# Patient Record
Sex: MALE | Race: WHITE | NOT HISPANIC OR LATINO | Employment: OTHER | URBAN - METROPOLITAN AREA
[De-identification: names, ages, dates, MRNs, and addresses within clinical notes are randomized per-mention and may not be internally consistent; named-entity substitution may affect disease eponyms.]

---

## 2023-12-18 ENCOUNTER — TRANSFERRED RECORDS (OUTPATIENT)
Dept: HEALTH INFORMATION MANAGEMENT | Facility: CLINIC | Age: 73
End: 2023-12-18

## 2024-01-04 ENCOUNTER — PRE VISIT (OUTPATIENT)
Dept: ONCOLOGY | Facility: CLINIC | Age: 74
End: 2024-01-04
Payer: MEDICARE

## 2024-01-04 ENCOUNTER — PATIENT OUTREACH (OUTPATIENT)
Dept: ONCOLOGY | Facility: CLINIC | Age: 74
End: 2024-01-04
Payer: MEDICARE

## 2024-01-04 ENCOUNTER — TRANSCRIBE ORDERS (OUTPATIENT)
Dept: OTHER | Age: 74
End: 2024-01-04

## 2024-01-04 DIAGNOSIS — C95.90 LEUKEMIA (H): Primary | ICD-10-CM

## 2024-01-04 NOTE — TELEPHONE ENCOUNTER
Action Taken  Date/Description  TJ     WT from NPS 2024  3:13 PM   Call from PT's wife Elisa to [facility] care and schedule for a Dx of Leukemia (H) [C95.90]    All records are at [facility]  Pt was Diagnosed just before [place] via labs  No bx or imaging has been done     Action 2024 1:38 PM ABT   Action Taken Called [facility] [phone] spoke to Salima, she states that they do not have the [facility] peripheral smear case and would have to call [facility] to get case/slides.    Called [facility] [phone] and spoke to Blayne in Histopath, unsure if they can send our peripheral smear slides. Blayne will call back after speaking to his manager Sai.    3:52 PM  Called back from [facility], states they can send out brady smear slides and to send request to [phone] with slide custody agreement form.     RECORDS STATUS - ALL OTHER DIAGNOSIS      RECORDS RECEIVED FROM: [facility]   DATE RECEIVED:    NOTES STATUS DETAILS   OFFICE NOTE from referring provider Self    OFFICE NOTE from other specialist [facility] 23: Pinky Duncan PA-C   MEDICATION LIST [facility]    LABS     PATHOLOGY REPORTS Req -[facility]  [employer]   ANYTHING RELATED TO DIAGNOSIS CE-[facility] Most recent 23

## 2024-01-04 NOTE — PROGRESS NOTES
[facility]: Hematology                                                                Hem/Onc  Referral reviewed  Referral Details  Referred By  Referred To   Referral from telephone call with the patient wife Elisa.    Diagnoses: Leukemia (H)   Order: Adult Oncology/Hematology  Referral    [facility]      Comment: Self referral for consult for leukemia. Patient has been seen by Pinky MURPHY.  Medical records are at [facility]  [address] [facility] [address]. Ph. [phone]      Order Questions  Question Answer   My Clinical Question Is: consult for leukemia      ASSESSMENT      Clinical History (per Nurse review of records provided):    73 year old male patient with leukocytosis and peripheral smear indicating possible new CLL. 12/12 office visit: c/o fatigue    [facility]   Pertinent labs -- BOOKMARKED  PCP provider note(s)-- BOOKMARKED    INTERVENTION(S)                                                      - OUTGOING CALL to pt:  Introduced my role as nurse navigator with [facility] Hematology/Oncology dept and that we have recd the referral for dx of possible CLL.  Elisa states that she is a patient of Dr Keys's for multiple myeloma and when PCP referred to [facility], pt's wife asked for referral to [facility] instead. They had not heard from us so she called in today. Explained to Elisa that I'm glad she called and we will coordinate consult asap.  Attempted to warm-transfer pt / wife to NPS to schedule consult next week, transfer unsuccessful.  Explained to pt that they will receive a call from our scheduling intake team and they denied any questions.    -Referral Triage Scheduling Instructions added to Hem/Onc  referral for Patient Access rep     PLAN                                                       Hematology / Oncology consult next week    See records team's Pre-Visit encounter documentation for additional records retrieval information.    Verónica Ramirez, RN, BSN, OCN  Hematology/Oncology New Patient Nurse Navigator   [facility]  [phone]

## 2024-01-11 ENCOUNTER — LAB (OUTPATIENT)
Dept: LAB | Facility: CLINIC | Age: 74
End: 2024-01-11
Payer: MEDICARE

## 2024-01-11 DIAGNOSIS — C95.90 LEUKEMIA (H): Primary | ICD-10-CM

## 2024-01-11 LAB
PATH REPORT.COMMENTS IMP SPEC: ABNORMAL
PATH REPORT.COMMENTS IMP SPEC: YES
PATH REPORT.FINAL DX SPEC: ABNORMAL
PATH REPORT.GROSS SPEC: ABNORMAL
PATH REPORT.RELEVANT HX SPEC: ABNORMAL
PATH REPORT.RELEVANT HX SPEC: ABNORMAL
PATH REPORT.SITE OF ORIGIN SPEC: ABNORMAL

## 2024-01-11 PROCEDURE — 88321 CONSLTJ&REPRT SLD PREP ELSWR: CPT | Performed by: PATHOLOGY

## 2024-01-12 ENCOUNTER — PATIENT OUTREACH (OUTPATIENT)
Dept: ONCOLOGY | Facility: CLINIC | Age: 74
End: 2024-01-12

## 2024-01-12 ENCOUNTER — ONCOLOGY VISIT (OUTPATIENT)
Dept: ONCOLOGY | Facility: CLINIC | Age: 74
End: 2024-01-12
Attending: INTERNAL MEDICINE
Payer: MEDICARE

## 2024-01-12 ENCOUNTER — APPOINTMENT (OUTPATIENT)
Dept: LAB | Facility: CLINIC | Age: 74
End: 2024-01-12
Attending: INTERNAL MEDICINE
Payer: MEDICARE

## 2024-01-12 VITALS
SYSTOLIC BLOOD PRESSURE: 134 MMHG | DIASTOLIC BLOOD PRESSURE: 77 MMHG | RESPIRATION RATE: 16 BRPM | TEMPERATURE: 97.8 F | HEART RATE: 70 BPM | OXYGEN SATURATION: 97 % | WEIGHT: 220.7 LBS
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
## 2024-01-12 DIAGNOSIS — Z11.59 ENCOUNTER FOR SCREENING FOR VIRAL DISEASE: ICD-10-CM

## 2024-01-12 DIAGNOSIS — D47.9 LYMPHOPROLIFERATIVE DISORDER (H): Primary | ICD-10-CM

## 2024-01-12 DIAGNOSIS — C85.99 NON-HODGKIN LYMPHOMA, UNSPECIFIED, EXTRANODAL AND SOLID ORGAN SITES (H): ICD-10-CM

## 2024-01-12 DIAGNOSIS — Z11.4 ENCOUNTER FOR SCREENING FOR HUMAN IMMUNODEFICIENCY VIRUS (HIV): ICD-10-CM

## 2024-01-12 LAB
ALBUMIN SERPL BCG-MCNC: 4.7 G/DL (ref 3.5–5.2)
ALP SERPL-CCNC: 74 U/L (ref 40–150)
ALT SERPL W P-5'-P-CCNC: 23 U/L (ref 0–70)
ANION GAP SERPL CALCULATED.3IONS-SCNC: 10 MMOL/L (ref 7–15)
AST SERPL W P-5'-P-CCNC: 23 U/L (ref 0–45)
BASOPHILS # BLD AUTO: ABNORMAL 10*3/UL
BASOPHILS # BLD MANUAL: 0 10E3/UL (ref 0–0.2)
BASOPHILS NFR BLD AUTO: ABNORMAL %
BASOPHILS NFR BLD MANUAL: 0 %
BILIRUB SERPL-MCNC: 0.7 MG/DL
BUN SERPL-MCNC: 23.4 MG/DL (ref 8–23)
CALCIUM SERPL-MCNC: 9.8 MG/DL (ref 8.8–10.2)
CHLORIDE SERPL-SCNC: 105 MMOL/L (ref 98–107)
CREAT SERPL-MCNC: 1.23 MG/DL (ref 0.67–1.17)
DEPRECATED HCO3 PLAS-SCNC: 25 MMOL/L (ref 22–29)
EGFRCR SERPLBLD CKD-EPI 2021: 62 ML/MIN/1.73M2
EOSINOPHIL # BLD AUTO: ABNORMAL 10*3/UL
EOSINOPHIL # BLD MANUAL: 0 10E3/UL (ref 0–0.7)
EOSINOPHIL NFR BLD AUTO: ABNORMAL %
EOSINOPHIL NFR BLD MANUAL: 0 %
ERYTHROCYTE [DISTWIDTH] IN BLOOD BY AUTOMATED COUNT: 12.9 % (ref 10–15)
GLUCOSE SERPL-MCNC: 120 MG/DL (ref 70–99)
HBV CORE AB SERPL QL IA: NONREACTIVE
HBV SURFACE AB SERPL IA-ACNC: <3.5 M[IU]/ML
HBV SURFACE AB SERPL IA-ACNC: NONREACTIVE M[IU]/ML
HBV SURFACE AG SERPL QL IA: NONREACTIVE
HCT VFR BLD AUTO: 48.9 % (ref 40–53)
HCV AB SERPL QL IA: NONREACTIVE
HGB BLD-MCNC: 16.7 G/DL (ref 13.3–17.7)
HIV 1+2 AB+HIV1 P24 AG SERPL QL IA: NONREACTIVE
IGA SERPL-MCNC: 120 MG/DL (ref 84–499)
IGG SERPL-MCNC: 996 MG/DL (ref 610–1616)
IGM SERPL-MCNC: 62 MG/DL (ref 35–242)
IMM GRANULOCYTES # BLD: ABNORMAL 10*3/UL
IMM GRANULOCYTES NFR BLD: ABNORMAL %
INTERPRETATION: NORMAL
KAPPA LC FREE SER-MCNC: 1.6 MG/DL (ref 0.33–1.94)
KAPPA LC FREE/LAMBDA FREE SER NEPH: 0.44 {RATIO} (ref 0.26–1.65)
LAMBDA LC FREE SERPL-MCNC: 3.67 MG/DL (ref 0.57–2.63)
LDH SERPL L TO P-CCNC: 171 U/L (ref 0–250)
LYMPHOCYTES # BLD AUTO: ABNORMAL 10*3/UL
LYMPHOCYTES # BLD MANUAL: 18.2 10E3/UL (ref 0.8–5.3)
LYMPHOCYTES NFR BLD AUTO: ABNORMAL %
LYMPHOCYTES NFR BLD MANUAL: 77 %
MCH RBC QN AUTO: 30.3 PG (ref 26.5–33)
MCHC RBC AUTO-ENTMCNC: 34.2 G/DL (ref 31.5–36.5)
MCV RBC AUTO: 89 FL (ref 78–100)
MONOCYTES # BLD AUTO: ABNORMAL 10*3/UL
MONOCYTES # BLD MANUAL: 1.2 10E3/UL (ref 0–1.3)
MONOCYTES NFR BLD AUTO: ABNORMAL %
MONOCYTES NFR BLD MANUAL: 5 %
NEUTROPHILS # BLD AUTO: ABNORMAL 10*3/UL
NEUTROPHILS # BLD MANUAL: 4.2 10E3/UL (ref 1.6–8.3)
NEUTROPHILS NFR BLD AUTO: ABNORMAL %
NEUTROPHILS NFR BLD MANUAL: 18 %
NRBC # BLD AUTO: 0 10E3/UL
NRBC BLD AUTO-RTO: 0 /100
PHOSPHATE SERPL-MCNC: 3.4 MG/DL (ref 2.5–4.5)
PLAT MORPH BLD: ABNORMAL
PLATELET # BLD AUTO: 212 10E3/UL (ref 150–450)
POTASSIUM SERPL-SCNC: 4.6 MMOL/L (ref 3.4–5.3)
PROT SERPL-MCNC: 7.2 G/DL (ref 6.4–8.3)
RBC # BLD AUTO: 5.52 10E6/UL (ref 4.4–5.9)
RBC MORPH BLD: ABNORMAL
SIGNIFICANT RESULTS: NORMAL
SODIUM SERPL-SCNC: 140 MMOL/L (ref 135–145)
SPECIMEN DESCRIPTION: NORMAL
TEST DETAILS, MDL: NORMAL
URATE SERPL-MCNC: 8.2 MG/DL (ref 3.4–7)
WBC # BLD AUTO: 23.6 10E3/UL (ref 4–11)

## 2024-01-12 PROCEDURE — 88271 CYTOGENETICS DNA PROBE: CPT | Performed by: INTERNAL MEDICINE

## 2024-01-12 PROCEDURE — 85007 BL SMEAR W/DIFF WBC COUNT: CPT | Performed by: INTERNAL MEDICINE

## 2024-01-12 PROCEDURE — 88368 INSITU HYBRIDIZATION MANUAL: CPT | Mod: 26 | Performed by: MEDICAL GENETICS

## 2024-01-12 PROCEDURE — 87340 HEPATITIS B SURFACE AG IA: CPT | Performed by: INTERNAL MEDICINE

## 2024-01-12 PROCEDURE — 84100 ASSAY OF PHOSPHORUS: CPT | Performed by: INTERNAL MEDICINE

## 2024-01-12 PROCEDURE — 81351 TP53 GENE FULL GENE SEQUENCE: CPT | Performed by: INTERNAL MEDICINE

## 2024-01-12 PROCEDURE — G0463 HOSPITAL OUTPT CLINIC VISIT: HCPCS | Performed by: INTERNAL MEDICINE

## 2024-01-12 PROCEDURE — 36415 COLL VENOUS BLD VENIPUNCTURE: CPT | Performed by: INTERNAL MEDICINE

## 2024-01-12 PROCEDURE — 86803 HEPATITIS C AB TEST: CPT | Performed by: INTERNAL MEDICINE

## 2024-01-12 PROCEDURE — 88369 M/PHMTRC ALYSISHQUANT/SEMIQ: CPT | Mod: 26 | Performed by: MEDICAL GENETICS

## 2024-01-12 PROCEDURE — 83521 IG LIGHT CHAINS FREE EACH: CPT | Performed by: INTERNAL MEDICINE

## 2024-01-12 PROCEDURE — 99204 OFFICE O/P NEW MOD 45 MIN: CPT | Performed by: INTERNAL MEDICINE

## 2024-01-12 PROCEDURE — 88189 FLOWCYTOMETRY/READ 16 & >: CPT | Mod: GC | Performed by: PATHOLOGY

## 2024-01-12 PROCEDURE — 83615 LACTATE (LD) (LDH) ENZYME: CPT | Performed by: INTERNAL MEDICINE

## 2024-01-12 PROCEDURE — 88185 FLOWCYTOMETRY/TC ADD-ON: CPT | Performed by: INTERNAL MEDICINE

## 2024-01-12 PROCEDURE — 84550 ASSAY OF BLOOD/URIC ACID: CPT | Performed by: INTERNAL MEDICINE

## 2024-01-12 PROCEDURE — 85027 COMPLETE CBC AUTOMATED: CPT | Performed by: INTERNAL MEDICINE

## 2024-01-12 PROCEDURE — 87389 HIV-1 AG W/HIV-1&-2 AB AG IA: CPT | Performed by: INTERNAL MEDICINE

## 2024-01-12 PROCEDURE — 86704 HEP B CORE ANTIBODY TOTAL: CPT | Performed by: INTERNAL MEDICINE

## 2024-01-12 PROCEDURE — G0452 MOLECULAR PATHOLOGY INTERPR: HCPCS | Mod: 26 | Performed by: STUDENT IN AN ORGANIZED HEALTH CARE EDUCATION/TRAINING PROGRAM

## 2024-01-12 PROCEDURE — 80053 COMPREHEN METABOLIC PANEL: CPT | Performed by: INTERNAL MEDICINE

## 2024-01-12 PROCEDURE — 86706 HEP B SURFACE ANTIBODY: CPT | Performed by: INTERNAL MEDICINE

## 2024-01-12 PROCEDURE — 82784 ASSAY IGA/IGD/IGG/IGM EACH: CPT | Performed by: INTERNAL MEDICINE

## 2024-01-12 PROCEDURE — 82232 ASSAY OF BETA-2 PROTEIN: CPT | Performed by: INTERNAL MEDICINE

## 2024-01-12 PROCEDURE — 86618 LYME DISEASE ANTIBODY: CPT | Performed by: INTERNAL MEDICINE

## 2024-01-12 ASSESSMENT — PAIN SCALES - GENERAL: PAINLEVEL: NO PAIN (0)

## 2024-01-12 NOTE — NURSING NOTE
Chief Complaint   Patient presents with    Blood Draw     Labs drawn via      Labs collected from venipuncture by RN. Vitals taken. Checked in for appointment(s).     Shannan MARTINEZ RN PHN BSN  BMT/Oncology Lab

## 2024-01-12 NOTE — LETTER
1/12/2024         RE: Kike Kee  [address]  [facility] [address]        Dear Colleague,    Thank you for referring your patient, Kike Kee, to the [facility]. Please see a copy of my visit note below.                                     [facility]               NEW PATIENT REFERRAL        Jan 12, 2024   Subjective  REFERRAL SOURCE: Self    REASON FOR VISIT: Lymphoproliferative disorder    HISTORY OF PRESENT ILLNESS:  Mr. Kike Kee is a 73 year old male who presents for consultation regarding lymphocytosis and possible lymphoproliferative disorder.     He was seen for annual Medicare wellness visit on 12/12/23 and was noted to have WBC of 19.7 with ALC 15, Hgb 16.9, and plts 175. Peripheral smear showed numerous circulating lymphocytes with clumped chromatin concerning for a lymphoproliferative disorder. Most recent CBC prior to that was 2019 where WBC was 11.1 and ALC mildly elevated at 4.6.     He is here today with his wife Elisa. Doing fairly well overall and denies any fevers/chills, night sweats, lumps/bumps, unintentional weight loss, chest pain, SOB, N/V, abd pain, diarrhea, or bleeding. He is more fatigued and napping more over the last year but still doing all normal activities and playing golf. He notes that he had a target-like rash on his left arm last summer and is wondering if he may have Lyme's disease. Did not see the tick itself.     PAST MEDICAL HISTORY:  Hypertension  Hyperlipidemia  Kidney stones    FAMILY HISTORY:  No hematologic malignancies that he is aware of.     SOCIAL HISTORY:  Never smoker. Drinks 1 glass of wine daily with dinner.   Lives in [place], [place] with wife Elisa. She has myeloma and is also a patient here. No kids.   Retired, previously worked as a . Now has a hobby collecting and selling ancient coins.      No Known Allergies  No current outpatient medications on file.     REVIEW OF  SYSTEMS:  12-point ROS reviewed and negative other than that mentioned in HPI.     Objective  VITAL SIGNS:  /77   Pulse 70   Temp 97.8  F (36.6  C) (Oral)   Resp 16   Wt 100.1 kg (220 lb 11.2 oz)   SpO2 97%     ECOG PS: 0     PHYSICAL EXAM:  General: Awake, alert, in no acute distress. Oriented x 3.  HEENT: Normocephalic, atraumatic. No scleral icterus.   Lymph: No cervical, supraclavicular, or axillary lymphadenopathy appreciated.   CV: Regular rate and rhythm. No murmurs, rubs, or gallops appreciated.  Resp: Good inspiratory effort, lungs clear to auscultation bilaterally.  GI: Abdomen soft, nontender, nondistended. No masses or organomegaly appreciated.   Ext: No peripheral edema bilaterally.  Neuro: CN II-XII grossly intact. No focal deficits.   Skin: No rash, unusual bruising or prominent lesions.  Psych: Pleasant, normal affect.    LABS:  Lab Results   Component Value Date    WBC 23.6 (H) 01/12/2024    HGB 16.7 01/12/2024    HCT 48.9 01/12/2024    MCV 89 01/12/2024     01/12/2024     Lab Results   Component Value Date     01/12/2024    POTASSIUM 4.6 01/12/2024    CR 1.23 (H) 01/12/2024    BUN 23.4 (H) 01/12/2024    CHLORIDE 105 01/12/2024    JOSE 9.8 01/12/2024     (H) 01/12/2024      Lab Results   Component Value Date    ALT 23 01/12/2024    AST 23 01/12/2024    ALKPHOS 74 01/12/2024    BILITOTAL 0.7 01/12/2024      Lab Results   Component Value Date     01/12/2024    URIC 8.2 (H) 01/12/2024     PATHOLOGY:  12/17/23 OS peripheral smear:  Peripheral blood ([id number], obtained 12/17/2023):                -Involved by a lymphoproliferative disorder (see comment)    The patient has an absolute lymphocytosis - 19 x10E3/uL.   A recent SPEP was negative for an M-spike.     This patient has numerous circulating lymphocytes, with mature chromatin; I favor a mature non-Hodgkin leukemia/lymphoma.  Smudge cells are present, and the chromatin is somewhat clumped.   Though I do  agree that CLL/SLL is in the differential, the morphologic findings are not entirely definitive for that (the cells are somewhat irregular and have more cytoplasm than expected).  It is imperative that this patient have peripheral blood flow cytometry performed to classify this lymphoproliferative disorder.  In addition, submitting samples at the same time for cytogenetics/FISH and NGS will also be helpful.     We agree with the outside diagnosis and appreciate the opportunity to review the case.    Assessment & Plan  #Lymphoproliferative disorder  Pleasant 73 year old male found to have absolute lymphocytosis to 15 on routine CBC. Most recent ALC in 2019 was 4.6 so this evolved sometime in the last 4 years. Hemoglobin, platelets, and LDH are normal. He is largely asymptomatic with no B-symptoms.     We will obtain peripheral flow cytometry for definitive diagnosis and he will need a PET-CT for staging as well. Differential diagnosis includes CLL/SLL and other low-grade B-cell lymphomas. We discussed that often CLL and low-grade B-cell lymphomas can just be observed if they are not causing B symptoms/other symptoms related to bulky LAD/splenomegaly or anemia/thrombocytopenia, which appears to be the case here. However we will discuss in much more detail once we have the final diagnosis and stage.    #Elevated Cr  #Elevated uric acid  Cr 1.23 today, baseline appears to be ~1.1. Uric acid also mildly elevated at 8.2. Unlikely to be related to TLS given low peripheral disease burden but will follow up flow cytometry and PET.   - Recommend increased PO fluid intake.   - Recheck next visit.     #Hx of targetoid rash  Had target rash and possible tick bite in summer 2023.  - Will check Lyme serologies.     #Ppx  - Recommend flu and Shingrix vaccines (pt to get at local [facility] or [employer]). He declines COVID booster.       PLAN:  - Labs today including peripheral flow cytometry, FISH, NGS  - PET-CT in next 2-3 weeks  -  Follow up with me after PET    Total of 50 minutes on patient visit, reviewing records, interpreting test results, placing orders, and documentation on the day of service.    Nai Meyer MD  Attending Physician, [facility]

## 2024-01-12 NOTE — NURSING NOTE
Oncology Rooming Note    January 12, 2024 11:44 AM   Kike Kee is a 73 year old male who presents for:    Chief Complaint   Patient presents with    Blood Draw     Labs drawn via     Oncology Clinic Visit     Leukemia      Initial Vitals: /77   Pulse 70   Temp 97.8  F (36.6  C) (Oral)   Resp 16   Wt 100.1 kg (220 lb 11.2 oz)   SpO2 97%  There is no height or weight on file to calculate BMI. There is no height or weight on file to calculate BSA.  No Pain (0) Comment: Data Unavailable   No LMP for male patient.  Allergies reviewed: Yes  Medications reviewed: Yes    Medications: Medication refills not needed today.  Pharmacy name entered into [employer]: [id number] - [facility] [address] [place] [address]    Frailty Screening:   Is the patient here for a new oncology consult visit in cancer care? 1. Yes. Over the past month, have you experienced difficulty or required a caregiver to assist with:   1. Balance, walking or general mobility (including any falls)? NO  2. Completion of self-care tasks such as bathing, dressing, toileting, grooming/hygiene?  NO  3. Concentration or memory that affects your daily life?  NO       Clinical concerns: no other complaints      Jose De Leon

## 2024-01-12 NOTE — PROGRESS NOTES
[facility]               NEW PATIENT REFERRAL        Jan 12, 2024   Subjective   REFERRAL SOURCE: Self    REASON FOR VISIT: Lymphoproliferative disorder    HISTORY OF PRESENT ILLNESS:  Mr. Kike Kee is a 73 year old male who presents for consultation regarding lymphocytosis and possible lymphoproliferative disorder.     He was seen for annual Medicare wellness visit on 12/12/23 and was noted to have WBC of 19.7 with ALC 15, Hgb 16.9, and plts 175. Peripheral smear showed numerous circulating lymphocytes with clumped chromatin concerning for a lymphoproliferative disorder. Most recent CBC prior to that was 2019 where WBC was 11.1 and ALC mildly elevated at 4.6.     He is here today with his wife Elisa. Doing fairly well overall and denies any fevers/chills, night sweats, lumps/bumps, unintentional weight loss, chest pain, SOB, N/V, abd pain, diarrhea, or bleeding. He is more fatigued and napping more over the last year but still doing all normal activities and playing golf. He notes that he had a target-like rash on his left arm last summer and is wondering if he may have Lyme's disease. Did not see the tick itself.     PAST MEDICAL HISTORY:  Hypertension  Hyperlipidemia  Kidney stones    FAMILY HISTORY:  No hematologic malignancies that he is aware of.     SOCIAL HISTORY:  Never smoker. Drinks 1 glass of wine daily with dinner.   Lives in [place], [place] with wife Elisa. She has myeloma and is also a patient here. No kids.   Retired, previously worked as a . Now has a hobby collecting and selling ancient coins.      No Known Allergies  No current outpatient medications on file.     REVIEW OF SYSTEMS:  12-point ROS reviewed and negative other than that mentioned in HPI.     Objective   VITAL SIGNS:  /77   Pulse 70   Temp 97.8  F (36.6  C) (Oral)   Resp 16   Wt 100.1 kg (220 lb 11.2 oz)   SpO2 97%     ECOG PS: 0     PHYSICAL EXAM:  General:  Awake, alert, in no acute distress. Oriented x 3.  HEENT: Normocephalic, atraumatic. No scleral icterus.   Lymph: No cervical, supraclavicular, or axillary lymphadenopathy appreciated.   CV: Regular rate and rhythm. No murmurs, rubs, or gallops appreciated.  Resp: Good inspiratory effort, lungs clear to auscultation bilaterally.  GI: Abdomen soft, nontender, nondistended. No masses or organomegaly appreciated.   Ext: No peripheral edema bilaterally.  Neuro: CN II-XII grossly intact. No focal deficits.   Skin: No rash, unusual bruising or prominent lesions.  Psych: Pleasant, normal affect.    LABS:  Lab Results   Component Value Date    WBC 23.6 (H) 01/12/2024    HGB 16.7 01/12/2024    HCT 48.9 01/12/2024    MCV 89 01/12/2024     01/12/2024     Lab Results   Component Value Date     01/12/2024    POTASSIUM 4.6 01/12/2024    CR 1.23 (H) 01/12/2024    BUN 23.4 (H) 01/12/2024    CHLORIDE 105 01/12/2024    JOSE 9.8 01/12/2024     (H) 01/12/2024      Lab Results   Component Value Date    ALT 23 01/12/2024    AST 23 01/12/2024    ALKPHOS 74 01/12/2024    BILITOTAL 0.7 01/12/2024      Lab Results   Component Value Date     01/12/2024    URIC 8.2 (H) 01/12/2024     PATHOLOGY:  12/17/23 OS peripheral smear:  Peripheral blood ([id number], obtained 12/17/2023):                -Involved by a lymphoproliferative disorder (see comment)    The patient has an absolute lymphocytosis - 19 x10E3/uL.   A recent SPEP was negative for an M-spike.     This patient has numerous circulating lymphocytes, with mature chromatin; I favor a mature non-Hodgkin leukemia/lymphoma.  Smudge cells are present, and the chromatin is somewhat clumped.   Though I do agree that CLL/SLL is in the differential, the morphologic findings are not entirely definitive for that (the cells are somewhat irregular and have more cytoplasm than expected).  It is imperative that this patient have peripheral blood flow cytometry performed to  classify this lymphoproliferative disorder.  In addition, submitting samples at the same time for cytogenetics/FISH and NGS will also be helpful.     We agree with the outside diagnosis and appreciate the opportunity to review the case.    Assessment & Plan   #Lymphoproliferative disorder  Pleasant 73 year old male found to have absolute lymphocytosis to 15 on routine CBC. Most recent ALC in 2019 was 4.6 so this evolved sometime in the last 4 years. Hemoglobin, platelets, and LDH are normal. He is largely asymptomatic with no B-symptoms.     We will obtain peripheral flow cytometry for definitive diagnosis and he will need a PET-CT for staging as well. Differential diagnosis includes CLL/SLL and other low-grade B-cell lymphomas. We discussed that often CLL and low-grade B-cell lymphomas can just be observed if they are not causing B symptoms/other symptoms related to bulky LAD/splenomegaly or anemia/thrombocytopenia, which appears to be the case here. However we will discuss in much more detail once we have the final diagnosis and stage.    #Elevated Cr  #Elevated uric acid  Cr 1.23 today, baseline appears to be ~1.1. Uric acid also mildly elevated at 8.2. Unlikely to be related to TLS given low peripheral disease burden but will follow up flow cytometry and PET.   - Recommend increased PO fluid intake.   - Recheck next visit.     #Hx of targetoid rash  Had target rash and possible tick bite in summer 2023.  - Will check Lyme serologies.     #Ppx  - Recommend flu and Shingrix vaccines (pt to get at local [facility] or [facility]). He declines COVID booster.       PLAN:  - Labs today including peripheral flow cytometry, FISH, NGS  - PET-CT in next 2-3 weeks  - Follow up with me after PET    Total of 50 minutes on patient visit, reviewing records, interpreting test results, placing orders, and documentation on the day of service.    Nai Meyer MD  Attending Physician, [facility]

## 2024-01-12 NOTE — PROGRESS NOTES
[facility]: Cancer Care Initial Note                                    Discussion with Patient:                                                      Met with Christiano after office visit/consult with Dr. Meyer. Introduced self as RN Care Coordinator. Went over contact information for [facility]. Discussed roles of RNCC, MD, BEHZAD, nurse triage line, and clinic coordinators. Discussed how to get a hold of different team members via [employer] and at [phone]. Authorization to discuss information form signed, gave permission to discuss medical information with spouse Elisa.       Assessment:                                                      Initial  Current living arrangement:: I live in a private home with spouse  Type of residence:: Private home - stairs  Informal Support system:: Spouse;Friends  Equipment Currently Used at Home: none  Bed or wheelchair confined:: No  Mobility Status: Independent  Transportation means:: Regular car  Medication adherence problem (GOAL):: No  Knowledgeable about how to use meds:: Yes  Medication side effects suspected:: No    Intervention/Education provided during outreach:                                                       Plan for PET scan and follow up visit with Dr. Meyer    Patient to follow up as scheduled at next appt  Patient to call/[employer] message with updates  Confirmed patient has clinic and triage numbers    Signature:  Verónica Oliver, RN

## 2024-01-15 LAB
B BURGDOR IGG+IGM SER QL: 0.06
B2 MICROGLOB TUMOR MARKER SER-MCNC: 2.4 MG/L
PATH REPORT.COMMENTS IMP SPEC: ABNORMAL
PATH REPORT.COMMENTS IMP SPEC: YES
PATH REPORT.FINAL DX SPEC: ABNORMAL
PATH REPORT.MICROSCOPIC SPEC OTHER STN: ABNORMAL
PATH REPORT.RELEVANT HX SPEC: ABNORMAL

## 2024-01-17 PROBLEM — I10 ESSENTIAL HYPERTENSION: Status: ACTIVE | Noted: 2019-01-02

## 2024-01-17 PROBLEM — R93.1 ELEVATED CORONARY ARTERY CALCIUM SCORE: Status: ACTIVE | Noted: 2019-10-15

## 2024-01-17 PROBLEM — K63.5 POLYP OF COLON: Status: ACTIVE | Noted: 2023-04-13

## 2024-01-17 PROBLEM — R97.20 ELEVATED PROSTATE SPECIFIC ANTIGEN (PSA): Status: ACTIVE | Noted: 2018-12-05

## 2024-01-19 LAB
CULTURE HARVEST COMPLETE DATE: NORMAL

## 2024-01-22 LAB
ABC CLONOSEQ B-CELL CLONALITY (ID) RESULT: NORMAL
ABC DOMINANT SEQUENCES (B-CELL): 4
ABC TOTAL NUCLEATED CELLS (B-CELL): NORMAL
Lab: NORMAL

## 2024-01-24 LAB — INTERPRETATION: NORMAL

## 2024-01-25 ENCOUNTER — HOSPITAL ENCOUNTER (OUTPATIENT)
Dept: PET IMAGING | Facility: CLINIC | Age: 74
Discharge: HOME | End: 2024-01-25
Attending: INTERNAL MEDICINE
Payer: MEDICARE

## 2024-01-25 DIAGNOSIS — C85.99 NON-HODGKIN LYMPHOMA, UNSPECIFIED, EXTRANODAL AND SOLID ORGAN SITES (H): ICD-10-CM

## 2024-01-25 DIAGNOSIS — D47.9 LYMPHOPROLIFERATIVE DISORDER (H): ICD-10-CM

## 2024-01-25 PROCEDURE — 78816 PET IMAGE W/CT FULL BODY: CPT | Mod: MG,PI

## 2024-01-25 PROCEDURE — 71260 CT THORAX DX C+: CPT

## 2024-01-25 PROCEDURE — 343N000001 HC RX 343: Performed by: INTERNAL MEDICINE

## 2024-01-25 PROCEDURE — 70491 CT SOFT TISSUE NECK W/DYE: CPT | Mod: 26 | Performed by: RADIOLOGY

## 2024-01-25 PROCEDURE — A9552 F18 FDG: HCPCS | Performed by: INTERNAL MEDICINE

## 2024-01-25 PROCEDURE — 74177 CT ABD & PELVIS W/CONTRAST: CPT | Mod: 26 | Performed by: RADIOLOGY

## 2024-01-25 PROCEDURE — 70491 CT SOFT TISSUE NECK W/DYE: CPT

## 2024-01-25 PROCEDURE — 78816 PET IMAGE W/CT FULL BODY: CPT | Mod: 26 | Performed by: RADIOLOGY

## 2024-01-25 PROCEDURE — 71260 CT THORAX DX C+: CPT | Mod: 26 | Performed by: RADIOLOGY

## 2024-01-25 PROCEDURE — G1010 CDSM STANSON: HCPCS | Performed by: RADIOLOGY

## 2024-01-25 PROCEDURE — 250N000011 HC RX IP 250 OP 636: Performed by: INTERNAL MEDICINE

## 2024-01-25 RX ORDER — IOPAMIDOL 755 MG/ML
20-135 INJECTION, SOLUTION INTRAVASCULAR ONCE
Status: COMPLETED | OUTPATIENT
Start: 2024-01-25 | End: 2024-01-25

## 2024-01-25 RX ADMIN — IOPAMIDOL 73 ML: 755 INJECTION, SOLUTION INTRAVENOUS at 08:12

## 2024-01-25 RX ADMIN — FLUDEOXYGLUCOSE F-18 12.53 MILLICURIE: 500 INJECTION, SOLUTION INTRAVENOUS at 07:16

## 2024-01-26 ENCOUNTER — LAB (OUTPATIENT)
Dept: LAB | Facility: CLINIC | Age: 74
End: 2024-01-26
Attending: INTERNAL MEDICINE
Payer: MEDICARE

## 2024-01-26 ENCOUNTER — ONCOLOGY VISIT (OUTPATIENT)
Dept: ONCOLOGY | Facility: CLINIC | Age: 74
End: 2024-01-26
Attending: INTERNAL MEDICINE
Payer: MEDICARE

## 2024-01-26 VITALS
HEART RATE: 63 BPM | TEMPERATURE: 97.8 F | OXYGEN SATURATION: 96 % | SYSTOLIC BLOOD PRESSURE: 149 MMHG | WEIGHT: 223 LBS | RESPIRATION RATE: 16 BRPM | DIASTOLIC BLOOD PRESSURE: 83 MMHG

## 2024-01-26 DIAGNOSIS — E04.1 THYROID NODULE: ICD-10-CM

## 2024-01-26 DIAGNOSIS — C91.10 CLL (CHRONIC LYMPHOCYTIC LEUKEMIA) (H): Primary | ICD-10-CM

## 2024-01-26 DIAGNOSIS — C91.10 CLL (CHRONIC LYMPHOCYTIC LEUKEMIA) (H): ICD-10-CM

## 2024-01-26 LAB
ALBUMIN SERPL BCG-MCNC: 4.5 G/DL (ref 3.5–5.2)
ALP SERPL-CCNC: 84 U/L (ref 40–150)
ALT SERPL W P-5'-P-CCNC: 33 U/L (ref 0–70)
ANION GAP SERPL CALCULATED.3IONS-SCNC: 9 MMOL/L (ref 7–15)
AST SERPL W P-5'-P-CCNC: 27 U/L (ref 0–45)
BASOPHILS # BLD AUTO: ABNORMAL 10*3/UL
BASOPHILS # BLD MANUAL: 0 10E3/UL (ref 0–0.2)
BASOPHILS NFR BLD AUTO: ABNORMAL %
BASOPHILS NFR BLD MANUAL: 0 %
BILIRUB SERPL-MCNC: 0.5 MG/DL
BUN SERPL-MCNC: 16.7 MG/DL (ref 8–23)
CALCIUM SERPL-MCNC: 9.7 MG/DL (ref 8.8–10.2)
CHLORIDE SERPL-SCNC: 105 MMOL/L (ref 98–107)
CREAT SERPL-MCNC: 1.2 MG/DL (ref 0.67–1.17)
DEPRECATED HCO3 PLAS-SCNC: 25 MMOL/L (ref 22–29)
EGFRCR SERPLBLD CKD-EPI 2021: 63 ML/MIN/1.73M2
EOSINOPHIL # BLD AUTO: ABNORMAL 10*3/UL
EOSINOPHIL # BLD MANUAL: 0.5 10E3/UL (ref 0–0.7)
EOSINOPHIL NFR BLD AUTO: ABNORMAL %
EOSINOPHIL NFR BLD MANUAL: 2 %
ERYTHROCYTE [DISTWIDTH] IN BLOOD BY AUTOMATED COUNT: 13.1 % (ref 10–15)
GLUCOSE SERPL-MCNC: 104 MG/DL (ref 70–99)
HCT VFR BLD AUTO: 47.6 % (ref 40–53)
HGB BLD-MCNC: 16.5 G/DL (ref 13.3–17.7)
IMM GRANULOCYTES # BLD: ABNORMAL 10*3/UL
IMM GRANULOCYTES NFR BLD: ABNORMAL %
LDH SERPL L TO P-CCNC: 195 U/L (ref 0–250)
LYMPHOCYTES # BLD AUTO: ABNORMAL 10*3/UL
LYMPHOCYTES # BLD MANUAL: 16.4 10E3/UL (ref 0.8–5.3)
LYMPHOCYTES NFR BLD AUTO: ABNORMAL %
LYMPHOCYTES NFR BLD MANUAL: 66 %
MCH RBC QN AUTO: 30.3 PG (ref 26.5–33)
MCHC RBC AUTO-ENTMCNC: 34.7 G/DL (ref 31.5–36.5)
MCV RBC AUTO: 88 FL (ref 78–100)
MONOCYTES # BLD AUTO: ABNORMAL 10*3/UL
MONOCYTES # BLD MANUAL: 0.5 10E3/UL (ref 0–1.3)
MONOCYTES NFR BLD AUTO: ABNORMAL %
MONOCYTES NFR BLD MANUAL: 2 %
NEUTROPHILS # BLD AUTO: ABNORMAL 10*3/UL
NEUTROPHILS # BLD MANUAL: 7.5 10E3/UL (ref 1.6–8.3)
NEUTROPHILS NFR BLD AUTO: ABNORMAL %
NEUTROPHILS NFR BLD MANUAL: 30 %
NRBC # BLD AUTO: 0 10E3/UL
NRBC BLD AUTO-RTO: 0 /100
PLAT MORPH BLD: ABNORMAL
PLATELET # BLD AUTO: 228 10E3/UL (ref 150–450)
POTASSIUM SERPL-SCNC: 4.4 MMOL/L (ref 3.4–5.3)
PROT SERPL-MCNC: 7 G/DL (ref 6.4–8.3)
RBC # BLD AUTO: 5.44 10E6/UL (ref 4.4–5.9)
RBC MORPH BLD: ABNORMAL
SODIUM SERPL-SCNC: 139 MMOL/L (ref 135–145)
TSH SERPL DL<=0.005 MIU/L-ACNC: 0.93 UIU/ML (ref 0.3–4.2)
URATE SERPL-MCNC: 6.9 MG/DL (ref 3.4–7)
WBC # BLD AUTO: 24.9 10E3/UL (ref 4–11)

## 2024-01-26 PROCEDURE — 84443 ASSAY THYROID STIM HORMONE: CPT

## 2024-01-26 PROCEDURE — G0463 HOSPITAL OUTPT CLINIC VISIT: HCPCS | Performed by: INTERNAL MEDICINE

## 2024-01-26 PROCEDURE — 80053 COMPREHEN METABOLIC PANEL: CPT

## 2024-01-26 PROCEDURE — 83615 LACTATE (LD) (LDH) ENZYME: CPT

## 2024-01-26 PROCEDURE — 85027 COMPLETE CBC AUTOMATED: CPT

## 2024-01-26 PROCEDURE — 99214 OFFICE O/P EST MOD 30 MIN: CPT | Performed by: INTERNAL MEDICINE

## 2024-01-26 PROCEDURE — 36415 COLL VENOUS BLD VENIPUNCTURE: CPT

## 2024-01-26 PROCEDURE — 84550 ASSAY OF BLOOD/URIC ACID: CPT

## 2024-01-26 PROCEDURE — 85007 BL SMEAR W/DIFF WBC COUNT: CPT

## 2024-01-26 RX ORDER — CHLORAL HYDRATE 500 MG
2000 CAPSULE ORAL
COMMUNITY

## 2024-01-26 RX ORDER — ATORVASTATIN CALCIUM 10 MG/1
10 TABLET, FILM COATED ORAL DAILY
COMMUNITY

## 2024-01-26 ASSESSMENT — PAIN SCALES - GENERAL: PAINLEVEL: NO PAIN (0)

## 2024-01-26 NOTE — LETTER
1/26/2024         RE: Kike Kee  [address]  [facility] [address]        Dear Colleague,    Thank you for referring your patient, Kike Kee, to the [facility]. Please see a copy of my visit note below.     [facility]      FOLLOW-UP VISIT NOTE                       Jan 26, 2024  Subjective  REASON FOR VISIT: Follow up CLL    ONCOLOGIC SUMMARY:  Diagnosis:  Chronic lymphocytic leukemia dx 12/2023, presenting with asymptomatic lymphocytosis (WBC 19.7, ALC 15). Hemoglobin and platelets normal. Peripheral flow with 22% CD5+ lambda-monotypic B-cells with immunophenotype of CLL/SLL. FISH with trisomy 12 and del 13q; no 17p deletion or IGH:CCND1 fusion. NGS negative for TP53 mutation. IGHV status indeterminate as two productive sequences were identified and one was mutated while the other was unmutated. PET with mildly FDG-avid bilateral axillary LAD, thyroid lesion, and borderline splenomegaly with no uptake.     Intent of treatment: Control    Treatment history:  1/2024 to present: observation    INTERVAL HISTORY:  Yas are here today follow up on peripheral studies and PET results. He denies any new symptoms since last visit including fevers, night sweats, lumps/bumps, bleeding, etc.     I have reviewed and updated the following:  No past medical history on file.   No Known Allergies    Current Outpatient Medications:     atorvastatin (LIPITOR) 10 MG tablet, Take 10 mg by mouth daily, Disp: , Rfl:     fish oil-omega-3 fatty acids 1000 MG capsule, Take 2,000 mg by mouth, Disp: , Rfl:     REVIEW OF SYSTEMS:  12-point ROS reviewed and negative other than that mentioned in HPI.     Objective  VITAL SIGNS:  BP (!) 149/83   Pulse 63   Temp 97.8  F (36.6  C)   Resp 16   Wt 101.2 kg (223 lb)   SpO2 96%     ECOG PS: 0     PHYSICAL EXAM:  General: Awake, alert, in no acute distress. Oriented x 3.  HEENT: Normocephalic, atraumatic. No scleral icterus.    Lymph: No cervical, supraclavicular, or axillary lymphadenopathy appreciated.   CV: Regular rate and rhythm. No murmurs, rubs, or gallops appreciated.  Resp: Good inspiratory effort, lungs clear to auscultation bilaterally.  GI: Abdomen soft, nontender, nondistended. No masses or organomegaly appreciated.   Ext: No peripheral edema bilaterally.  Neuro: CN II-XII grossly intact. No focal deficits.   Skin: No rash, unusual bruising or prominent lesions.  Psych: Pleasant, normal affect.    LABS:  I reviewed the following labs:  Lab Results   Component Value Date    WBC 24.9 (H) 01/26/2024    HGB 16.5 01/26/2024    HCT 47.6 01/26/2024    MCV 88 01/26/2024     01/26/2024     Lab Results   Component Value Date     01/26/2024    POTASSIUM 4.4 01/26/2024    CR 1.20 (H) 01/26/2024    BUN 16.7 01/26/2024    CHLORIDE 105 01/26/2024    JOSE 9.7 01/26/2024     (H) 01/26/2024      Lab Results   Component Value Date    ALT 33 01/26/2024    AST 27 01/26/2024    ALKPHOS 84 01/26/2024    BILITOTAL 0.5 01/26/2024      Lab Results   Component Value Date     01/26/2024    URIC 6.9 01/26/2024 1/12/24 Peripheral flow cytometry:  A. Peripheral Blood:  -CD5 positive lambda-monotypic B cells (22%)  -No aberrant immunophenotype on T cells    A monotypic B cell population with the immunophenotype of chronic lymphocytic leukemia/small lymphocytic lymphoma (CLL/SLL) is present - though I favor a diagnosis of CLL/SLL, the CD23 expression is not uniform - correlation with pending FISH to exclude mantle cell lymphoma is required (reported separately).      Based on the reported WBC of 23.6 x10^9/L, the findings are consistent with CLL/SLL . Clinical correlation to evaluate for lymph node involvement is recommended.      The monotypic B cell population is positive for CD38 and positive for CD49d. CD38 and CD49d are potential prognostic markers in CLL/SLL (Reference: NCCN practice guidelines for CLL/SLL version  4.2021, www.nccn.org).    1/12/24 FISH:  RESULTS:     ABNORMAL  - Trisomy 12 (72.5%)  - Loss of [id number] (13q) (3.125%; control range 0-1.8%)     WITHIN NORMAL LIMITS  - Rates of loss of MYB (6q), DOC (11q), and TP53 (17p) within normal limits  - No IGH::CCND1 fusion    1/12/24 NGS:  No clinically relevant mutations were detected in TP53.     1/25/24 PET-CT:  IMPRESSION:   In this patient with history of non-Hodgkin's lymphoma:  1. Bilateral axillary FDG avid adenopathy consistent with known  disease involvement (Deauville 4).  2. Intensely avid 2.7 cm left thyroid lesion. This may represent  lymphoma involvement (Deauville 5) versus second primary thyroid  malignancy. Recommend thyroid ultrasound and biopsy for further  evaluation.  3. 2.7 cm liver lesion is iso metabolic with the liver, indeterminate  may represent hemangioma versus other etiology. Recommend liver  ultrasound for further evaluation. Given lack of hypermetabolism  unlikely to reflect lymphoma involvement.  4. Splenomegaly without focal lesion.    Assessment & Plan  #CLL/SLL, Mayen stage 1, intermediate-risk  Pleasant 74 year old male found to have absolute lymphocytosis to 15 on routine CBC. Most recent ALC in 2019 was 4.6 so this evolved sometime in the last 4 years. Hemoglobin, platelets, and LDH are normal. He is largely asymptomatic with no B-symptoms. Peripheral flow with 22% CD5+ lambda-monotypic B-cells consistent with CLL/SLL. Intermediate risk with trisomy 12 and del 13q, no del 17p or TP53 mutation. IGHV status indeterminate. PET with axillary LAD and borderline splenomegaly (but not palpable on exam) so this is Mayen stage I. Other incidental findings below which may or may not be related.     We reviewed the overall diagnosis and prognosis of CLL/SLL. We discussed that often CLL and low-grade B-cell lymphomas can just be observed if they are not causing B symptoms/other symptoms related to bulky LAD/splenomegaly or anemia/thrombocytopenia,  which appears to be the case here. Plan to initiate surveillance with labs/visits every 3 months for the first year. Discussed symptoms to watch out for.     #Thyroid mass  2.7 cm left thyroid lesion noted on staging PET with SUVmax 6.8. Ddx includes lymphoma or primary thyroid malignancy.   - Obtain TSH and thyroid ultrasound. Will likely need FNA due to size.     #Elevated Cr  #Elevated uric acid, resolved  Cr 1.23 and uric acid 8.2 on 1/12/24. Baseline Cr appears to be ~1.1-1.2?  - Encouraged more PO fluid intake, uric acid now normal at 6.9 today.  - Monitor.    #Hx of targetoid rash  Had target rash and possible tick bite in summer 2023.  - Lyme serologies returned negative.      #Ppx  IgG 996, no history of recurrent infections.  - Vaccines: Got RSV and Shingrix dose #1 on 1/16/24, will be due for second dose in 2 months. He previously declined COVID booster.       PLAN:  - Thyroid U/S and FNA  - RTC in 3 months    Total of 30 minutes on patient visit, reviewing records, interpreting test results, placing orders, and documentation on the day of service.    Nai Meyer MD  Attending Physician, [facility]

## 2024-01-26 NOTE — NURSING NOTE
Oncology Rooming Note    January 26, 2024 11:20 AM   Kike Kee is a 74 year old male who presents for:    Chief Complaint   Patient presents with    Oncology Clinic Visit     Non-Hodgkin lymphoma, unspecified, extranodal and solid organ sites    Labs Only     Vitals checked, no lab orders     Initial Vitals: BP (!) 149/83   Pulse 63   Temp 97.8  F (36.6  C)   Resp 16   Wt 101.2 kg (223 lb)   SpO2 96%  There is no height or weight on file to calculate BMI. There is no height or weight on file to calculate BSA.  No Pain (0) Comment: Data Unavailable   No LMP for male patient.    Allergies reviewed: Yes  Medications reviewed: Yes    Medications: Medication refills not needed today.  Pharmacy name entered into [employer]: [facility] #[id number] - [place], [place] - [address]    Frailty Screening:   Is the patient here for a new oncology consult visit in cancer care? 2. No      Clinical concerns: none.       Devyn Bustamante

## 2024-01-26 NOTE — NURSING NOTE
Chief Complaint   Patient presents with    Oncology Clinic Visit     Non-Hodgkin lymphoma, unspecified, extranodal and solid organ sites    Labs Only     Vitals checked, no lab orders     Nyla Stern RN on 1/26/2024 at 11:14 AM

## 2024-01-26 NOTE — PROGRESS NOTES
[facility]      FOLLOW-UP VISIT NOTE                       Jan 26, 2024  Subjective   REASON FOR VISIT: Follow up CLL    ONCOLOGIC SUMMARY:  Diagnosis:  Chronic lymphocytic leukemia dx 12/2023, presenting with asymptomatic lymphocytosis (WBC 19.7, ALC 15). Hemoglobin and platelets normal. Peripheral flow with 22% CD5+ lambda-monotypic B-cells with immunophenotype of CLL/SLL. FISH with trisomy 12 and del 13q; no 17p deletion or IGH:CCND1 fusion. NGS negative for TP53 mutation. IGHV status indeterminate as two productive sequences were identified and one was mutated while the other was unmutated. PET with mildly FDG-avid bilateral axillary LAD, thyroid lesion, and borderline splenomegaly with no uptake.     Intent of treatment: Control    Treatment history:  1/2024 to present: observation    INTERVAL HISTORY:  Yas are here today follow up on peripheral studies and PET results. He denies any new symptoms since last visit including fevers, night sweats, lumps/bumps, bleeding, etc.     I have reviewed and updated the following:  No past medical history on file.   No Known Allergies    Current Outpatient Medications:     atorvastatin (LIPITOR) 10 MG tablet, Take 10 mg by mouth daily, Disp: , Rfl:     fish oil-omega-3 fatty acids 1000 MG capsule, Take 2,000 mg by mouth, Disp: , Rfl:     REVIEW OF SYSTEMS:  12-point ROS reviewed and negative other than that mentioned in HPI.     Objective   VITAL SIGNS:  BP (!) 149/83   Pulse 63   Temp 97.8  F (36.6  C)   Resp 16   Wt 101.2 kg (223 lb)   SpO2 96%     ECOG PS: 0     PHYSICAL EXAM:  General: Awake, alert, in no acute distress. Oriented x 3.  HEENT: Normocephalic, atraumatic. No scleral icterus.   Lymph: No cervical, supraclavicular, or axillary lymphadenopathy appreciated.   CV: Regular rate and rhythm. No murmurs, rubs, or gallops appreciated.  Resp: Good inspiratory effort, lungs clear to auscultation bilaterally.  GI: Abdomen soft, nontender,  nondistended. No masses or organomegaly appreciated.   Ext: No peripheral edema bilaterally.  Neuro: CN II-XII grossly intact. No focal deficits.   Skin: No rash, unusual bruising or prominent lesions.  Psych: Pleasant, normal affect.    LABS:  I reviewed the following labs:  Lab Results   Component Value Date    WBC 24.9 (H) 01/26/2024    HGB 16.5 01/26/2024    HCT 47.6 01/26/2024    MCV 88 01/26/2024     01/26/2024     Lab Results   Component Value Date     01/26/2024    POTASSIUM 4.4 01/26/2024    CR 1.20 (H) 01/26/2024    BUN 16.7 01/26/2024    CHLORIDE 105 01/26/2024    JOSE 9.7 01/26/2024     (H) 01/26/2024      Lab Results   Component Value Date    ALT 33 01/26/2024    AST 27 01/26/2024    ALKPHOS 84 01/26/2024    BILITOTAL 0.5 01/26/2024      Lab Results   Component Value Date     01/26/2024    URIC 6.9 01/26/2024 1/12/24 Peripheral flow cytometry:  A. Peripheral Blood:  -CD5 positive lambda-monotypic B cells (22%)  -No aberrant immunophenotype on T cells    A monotypic B cell population with the immunophenotype of chronic lymphocytic leukemia/small lymphocytic lymphoma (CLL/SLL) is present - though I favor a diagnosis of CLL/SLL, the CD23 expression is not uniform - correlation with pending FISH to exclude mantle cell lymphoma is required (reported separately).      Based on the reported WBC of 23.6 x10^9/L, the findings are consistent with CLL/SLL . Clinical correlation to evaluate for lymph node involvement is recommended.      The monotypic B cell population is positive for CD38 and positive for CD49d. CD38 and CD49d are potential prognostic markers in CLL/SLL (Reference: NCCN practice guidelines for CLL/SLL version 4.2021, www.nccn.org).    1/12/24 FISH:  RESULTS:     ABNORMAL  - Trisomy 12 (72.5%)  - Loss of [id number] (13q) (3.125%; control range 0-1.8%)     WITHIN NORMAL LIMITS  - Rates of loss of MYB (6q), DOC (11q), and TP53 (17p) within normal limits  - No IGH::CCND1  fusion    1/12/24 NGS:  No clinically relevant mutations were detected in TP53.     1/25/24 PET-CT:  IMPRESSION:   In this patient with history of non-Hodgkin's lymphoma:  1. Bilateral axillary FDG avid adenopathy consistent with known  disease involvement (Deauville 4).  2. Intensely avid 2.7 cm left thyroid lesion. This may represent  lymphoma involvement (Deauville 5) versus second primary thyroid  malignancy. Recommend thyroid ultrasound and biopsy for further  evaluation.  3. 2.7 cm liver lesion is iso metabolic with the liver, indeterminate  may represent hemangioma versus other etiology. Recommend liver  ultrasound for further evaluation. Given lack of hypermetabolism  unlikely to reflect lymphoma involvement.  4. Splenomegaly without focal lesion.    Assessment & Plan   #CLL/SLL, Mayen stage 1, intermediate-risk  Pleasant 74 year old male found to have absolute lymphocytosis to 15 on routine CBC. Most recent ALC in 2019 was 4.6 so this evolved sometime in the last 4 years. Hemoglobin, platelets, and LDH are normal. He is largely asymptomatic with no B-symptoms. Peripheral flow with 22% CD5+ lambda-monotypic B-cells consistent with CLL/SLL. Intermediate risk with trisomy 12 and del 13q, no del 17p or TP53 mutation. IGHV status indeterminate. PET with axillary LAD and borderline splenomegaly (but not palpable on exam) so this is Mayen stage I. Other incidental findings below which may or may not be related.     We reviewed the overall diagnosis and prognosis of CLL/SLL. We discussed that often CLL and low-grade B-cell lymphomas can just be observed if they are not causing B symptoms/other symptoms related to bulky LAD/splenomegaly or anemia/thrombocytopenia, which appears to be the case here. Plan to initiate surveillance with labs/visits every 3 months for the first year. Discussed symptoms to watch out for.     #Thyroid mass  2.7 cm left thyroid lesion noted on staging PET with SUVmax 6.8. Ddx includes lymphoma  or primary thyroid malignancy.   - Obtain TSH and thyroid ultrasound. Will likely need FNA due to size.     #Elevated Cr  #Elevated uric acid, resolved  Cr 1.23 and uric acid 8.2 on 1/12/24. Baseline Cr appears to be ~1.1-1.2?  - Encouraged more PO fluid intake, uric acid now normal at 6.9 today.  - Monitor.    #Hx of targetoid rash  Had target rash and possible tick bite in summer 2023.  - Lyme serologies returned negative.      #Ppx  IgG 996, no history of recurrent infections.  - Vaccines: Got RSV and Shingrix dose #1 on 1/16/24, will be due for second dose in 2 months. He previously declined COVID booster.       PLAN:  - Thyroid U/S and FNA  - RTC in 3 months    Total of 30 minutes on patient visit, reviewing records, interpreting test results, placing orders, and documentation on the day of service.    Nai Meyer MD  Attending Physician, [facility]

## 2024-01-26 NOTE — NURSING NOTE
Chief Complaint   Patient presents with    Lab Only     Labs drawn with  by rn.      Labs drawn with  by rn.  Pt tolerated well.      Radha Davis RN

## 2024-01-31 ENCOUNTER — ANCILLARY PROCEDURE (OUTPATIENT)
Dept: ULTRASOUND IMAGING | Facility: CLINIC | Age: 74
End: 2024-01-31
Attending: INTERNAL MEDICINE
Payer: MEDICARE

## 2024-01-31 DIAGNOSIS — E04.1 THYROID NODULE: ICD-10-CM

## 2024-01-31 DIAGNOSIS — C91.10 CLL (CHRONIC LYMPHOCYTIC LEUKEMIA) (H): ICD-10-CM

## 2024-01-31 PROCEDURE — 76536 US EXAM OF HEAD AND NECK: CPT | Mod: GC | Performed by: RADIOLOGY

## 2024-02-13 ENCOUNTER — ANCILLARY PROCEDURE (OUTPATIENT)
Dept: ULTRASOUND IMAGING | Facility: CLINIC | Age: 74
End: 2024-02-13
Attending: INTERNAL MEDICINE
Payer: MEDICARE

## 2024-02-13 DIAGNOSIS — E04.1 THYROID NODULE: ICD-10-CM

## 2024-02-13 PROCEDURE — 10005 FNA BX W/US GDN 1ST LES: CPT | Performed by: STUDENT IN AN ORGANIZED HEALTH CARE EDUCATION/TRAINING PROGRAM

## 2024-02-13 PROCEDURE — 88173 CYTOPATH EVAL FNA REPORT: CPT | Performed by: PATHOLOGY

## 2024-02-16 LAB
PATH REPORT.COMMENTS IMP SPEC: NORMAL
PATH REPORT.FINAL DX SPEC: NORMAL
PATH REPORT.GROSS SPEC: NORMAL
PATH REPORT.MICROSCOPIC SPEC OTHER STN: NORMAL
PATH REPORT.RELEVANT HX SPEC: NORMAL

## 2024-04-18 ENCOUNTER — ONCOLOGY VISIT (OUTPATIENT)
Dept: ONCOLOGY | Facility: CLINIC | Age: 74
End: 2024-04-18
Attending: INTERNAL MEDICINE
Payer: MEDICARE

## 2024-04-18 ENCOUNTER — APPOINTMENT (OUTPATIENT)
Dept: LAB | Facility: CLINIC | Age: 74
End: 2024-04-18
Attending: INTERNAL MEDICINE
Payer: MEDICARE

## 2024-04-18 VITALS
HEART RATE: 65 BPM | RESPIRATION RATE: 16 BRPM | OXYGEN SATURATION: 95 % | WEIGHT: 221.5 LBS | DIASTOLIC BLOOD PRESSURE: 76 MMHG | TEMPERATURE: 97.8 F | SYSTOLIC BLOOD PRESSURE: 131 MMHG

## 2024-04-18 DIAGNOSIS — E04.1 THYROID NODULE: ICD-10-CM

## 2024-04-18 DIAGNOSIS — C91.10 CLL (CHRONIC LYMPHOCYTIC LEUKEMIA) (H): Primary | ICD-10-CM

## 2024-04-18 LAB
ALBUMIN SERPL BCG-MCNC: 4.5 G/DL (ref 3.5–5.2)
ALP SERPL-CCNC: 80 U/L (ref 40–150)
ALT SERPL W P-5'-P-CCNC: 18 U/L (ref 0–70)
ANION GAP SERPL CALCULATED.3IONS-SCNC: 12 MMOL/L (ref 7–15)
AST SERPL W P-5'-P-CCNC: 27 U/L (ref 0–45)
BASOPHILS # BLD AUTO: ABNORMAL 10*3/UL
BASOPHILS # BLD MANUAL: 0 10E3/UL (ref 0–0.2)
BASOPHILS NFR BLD AUTO: ABNORMAL %
BASOPHILS NFR BLD MANUAL: 0 %
BILIRUB SERPL-MCNC: 0.6 MG/DL
BUN SERPL-MCNC: 19.7 MG/DL (ref 8–23)
CALCIUM SERPL-MCNC: 9.4 MG/DL (ref 8.8–10.2)
CHLORIDE SERPL-SCNC: 107 MMOL/L (ref 98–107)
CREAT SERPL-MCNC: 1.2 MG/DL (ref 0.67–1.17)
DEPRECATED HCO3 PLAS-SCNC: 22 MMOL/L (ref 22–29)
EGFRCR SERPLBLD CKD-EPI 2021: 63 ML/MIN/1.73M2
EOSINOPHIL # BLD AUTO: ABNORMAL 10*3/UL
EOSINOPHIL # BLD MANUAL: 0.5 10E3/UL (ref 0–0.7)
EOSINOPHIL NFR BLD AUTO: ABNORMAL %
EOSINOPHIL NFR BLD MANUAL: 2 %
ERYTHROCYTE [DISTWIDTH] IN BLOOD BY AUTOMATED COUNT: 13.1 % (ref 10–15)
GLUCOSE SERPL-MCNC: 134 MG/DL (ref 70–99)
HCT VFR BLD AUTO: 47.6 % (ref 40–53)
HGB BLD-MCNC: 16.3 G/DL (ref 13.3–17.7)
IMM GRANULOCYTES # BLD: ABNORMAL 10*3/UL
IMM GRANULOCYTES NFR BLD: ABNORMAL %
LDH SERPL L TO P-CCNC: 191 U/L (ref 0–250)
LYMPHOCYTES # BLD AUTO: ABNORMAL 10*3/UL
LYMPHOCYTES # BLD MANUAL: 19.3 10E3/UL (ref 0.8–5.3)
LYMPHOCYTES NFR BLD AUTO: ABNORMAL %
LYMPHOCYTES NFR BLD MANUAL: 71 %
MCH RBC QN AUTO: 30.4 PG (ref 26.5–33)
MCHC RBC AUTO-ENTMCNC: 34.2 G/DL (ref 31.5–36.5)
MCV RBC AUTO: 89 FL (ref 78–100)
MONOCYTES # BLD AUTO: ABNORMAL 10*3/UL
MONOCYTES # BLD MANUAL: 1.1 10E3/UL (ref 0–1.3)
MONOCYTES NFR BLD AUTO: ABNORMAL %
MONOCYTES NFR BLD MANUAL: 4 %
NEUTROPHILS # BLD AUTO: ABNORMAL 10*3/UL
NEUTROPHILS # BLD MANUAL: 6.3 10E3/UL (ref 1.6–8.3)
NEUTROPHILS NFR BLD AUTO: ABNORMAL %
NEUTROPHILS NFR BLD MANUAL: 23 %
NRBC # BLD AUTO: 0 10E3/UL
NRBC BLD AUTO-RTO: 0 /100
PLAT MORPH BLD: ABNORMAL
PLATELET # BLD AUTO: 212 10E3/UL (ref 150–450)
POTASSIUM SERPL-SCNC: 4.7 MMOL/L (ref 3.4–5.3)
PROT SERPL-MCNC: 6.9 G/DL (ref 6.4–8.3)
RBC # BLD AUTO: 5.37 10E6/UL (ref 4.4–5.9)
RBC MORPH BLD: ABNORMAL
SODIUM SERPL-SCNC: 141 MMOL/L (ref 135–145)
TSH SERPL DL<=0.005 MIU/L-ACNC: 0.93 UIU/ML (ref 0.3–4.2)
URATE SERPL-MCNC: 7.3 MG/DL (ref 3.4–7)
WBC # BLD AUTO: 27.2 10E3/UL (ref 4–11)

## 2024-04-18 PROCEDURE — 84443 ASSAY THYROID STIM HORMONE: CPT | Performed by: INTERNAL MEDICINE

## 2024-04-18 PROCEDURE — 84550 ASSAY OF BLOOD/URIC ACID: CPT | Performed by: INTERNAL MEDICINE

## 2024-04-18 PROCEDURE — 85018 HEMOGLOBIN: CPT | Performed by: INTERNAL MEDICINE

## 2024-04-18 PROCEDURE — 85007 BL SMEAR W/DIFF WBC COUNT: CPT | Performed by: INTERNAL MEDICINE

## 2024-04-18 PROCEDURE — 36415 COLL VENOUS BLD VENIPUNCTURE: CPT | Performed by: INTERNAL MEDICINE

## 2024-04-18 PROCEDURE — 83615 LACTATE (LD) (LDH) ENZYME: CPT | Performed by: INTERNAL MEDICINE

## 2024-04-18 PROCEDURE — G0463 HOSPITAL OUTPT CLINIC VISIT: HCPCS | Performed by: INTERNAL MEDICINE

## 2024-04-18 PROCEDURE — 80053 COMPREHEN METABOLIC PANEL: CPT | Performed by: INTERNAL MEDICINE

## 2024-04-18 PROCEDURE — 99214 OFFICE O/P EST MOD 30 MIN: CPT | Performed by: INTERNAL MEDICINE

## 2024-04-18 RX ORDER — SODIUM PHOSPHATE,MONO-DIBASIC 19G-7G/118
1 ENEMA (ML) RECTAL DAILY
COMMUNITY

## 2024-04-18 ASSESSMENT — PAIN SCALES - GENERAL: PAINLEVEL: NO PAIN (0)

## 2024-04-18 NOTE — NURSING NOTE
Oncology Rooming Note    April 18, 2024 1:16 PM   Kike Kee is a 74 year old male who presents for:    Chief Complaint   Patient presents with    Blood Draw     Labs drawn via  by RN in lab.  VS taken    Oncology Clinic Visit     Chronic lymphocytic leukemia      Initial Vitals: /76   Pulse 65   Temp 97.8  F (36.6  C) (Oral)   Resp 16   Wt 100.5 kg (221 lb 8 oz)   SpO2 95%  There is no height or weight on file to calculate BMI. There is no height or weight on file to calculate BSA.  No Pain (0) Comment: Data Unavailable   No LMP for male patient.  Allergies reviewed: Yes  Medications reviewed: Yes    Medications: Medication refills not needed today.  Pharmacy name entered into [employer]: [facility] #[id number] - [place], [place] - [address]    Frailty Screening:   Is the patient here for a new oncology consult visit in cancer care? 2. No      Clinical concerns:  none      Hannah Andrews

## 2024-04-18 NOTE — PROGRESS NOTES
[facility]      FOLLOW-UP VISIT NOTE                       Apr 18, 2024  Subjective   REASON FOR VISIT: Follow up CLL    ONCOLOGIC SUMMARY:  Diagnosis:  Chronic lymphocytic leukemia dx 12/2023, presenting with asymptomatic lymphocytosis (WBC 19.7, ALC 15). Hemoglobin and platelets normal. Peripheral flow with 22% CD5+ lambda-monotypic B-cells with immunophenotype of CLL/SLL. FISH with trisomy 12 and del 13q; no 17p deletion or IGH:CCND1 fusion. NGS negative for TP53 mutation. IGHV status indeterminate as two productive sequences were identified and one was mutated while the other was unmutated. PET with mildly FDG-avid bilateral axillary LAD, thyroid lesion, and borderline splenomegaly with no uptake.     Intent of treatment: Control    Treatment history:  1/2024 to present: observation    INTERVAL HISTORY:  Christiano is here today with his wife Elisa for follow-up. He denies any new symptoms since last visit including fevers, night sweats, lumps/bumps, SOB, N/V, bleeding, etc. He has some fatigue but still doing normal activities like golfing. Appetite is good.     I have reviewed and updated the following:  No past medical history on file.   No Known Allergies    Current Outpatient Medications:     atorvastatin (LIPITOR) 10 MG tablet, Take 10 mg by mouth daily, Disp: , Rfl:     fish oil-omega-3 fatty acids 1000 MG capsule, Take 2,000 mg by mouth, Disp: , Rfl:     glucosamine-chondroitin 500-400 MG CAPS per capsule, Take 1 capsule by mouth daily, Disp: , Rfl:     REVIEW OF SYSTEMS:  12-point ROS reviewed and negative other than that mentioned in HPI.     Objective   VITAL SIGNS:  /76   Pulse 65   Temp 97.8  F (36.6  C) (Oral)   Resp 16   Wt 100.5 kg (221 lb 8 oz)   SpO2 95%     ECOG PS: 0     PHYSICAL EXAM:  General: Awake, alert, in no acute distress. Oriented x 3.  HEENT: Normocephalic, atraumatic. No scleral icterus.   Lymph: No cervical, supraclavicular, or axillary lymphadenopathy  appreciated.   CV: Regular rate and rhythm. No murmurs, rubs, or gallops appreciated.  Resp: Good inspiratory effort, lungs clear to auscultation bilaterally.  GI: Abdomen soft, nontender, nondistended. No masses or organomegaly appreciated.   Ext: No peripheral edema bilaterally.  Neuro: CN II-XII grossly intact. No focal deficits.   Skin: No rash, unusual bruising or prominent lesions.  Psych: Pleasant, normal affect.    LABS:  I reviewed the following labs:  Lab Results   Component Value Date    WBC 27.2 (H) 04/18/2024    HGB 16.3 04/18/2024    HCT 47.6 04/18/2024    MCV 89 04/18/2024     04/18/2024     Lab Results   Component Value Date     04/18/2024    POTASSIUM 4.7 04/18/2024    CR 1.20 (H) 04/18/2024    BUN 19.7 04/18/2024    CHLORIDE 107 04/18/2024    JOSE 9.4 04/18/2024     (H) 04/18/2024      Lab Results   Component Value Date    ALT 18 04/18/2024    AST 27 04/18/2024    ALKPHOS 80 04/18/2024    BILITOTAL 0.6 04/18/2024      Lab Results   Component Value Date     04/18/2024    URIC 7.3 (H) 04/18/2024      Assessment & Plan   #CLL/SLL, Mayen stage 1, intermediate-risk  Dx 12/2023, found to have absolute lymphocytosis to 15 on routine CBC. Most recent ALC in 2019 was 4.6 so this evolved sometime in the last 4 years. Hemoglobin, platelets, and LDH are normal. He is largely asymptomatic with no B-symptoms. Peripheral flow with 22% CD5+ lambda-monotypic B-cells consistent with CLL/SLL. Intermediate risk with trisomy 12 and del 13q, no del 17p or TP53 mutation. IGHV status indeterminate. PET with axillary LAD and borderline splenomegaly (but not palpable on exam) so this is Mayen stage I. Other incidental findings below which may or may not be related. He is currently on surveillance as there as no indications to start treatment.   - Labs and exam reassuring today. ALC stable ~19, Hgb and plts still normal. Continue surveillance.   - We reviewed symptoms to watch out for including fevers,  night sweats, unintentional weight loss, or rapidly enlarging lymphadenopathy. Discussed that other indications for treatment would include anemia/thrombocytopenia or ALC doubling time <6 months, which he does not have currently.      #Thyroid mass  2.7 cm left thyroid lesion noted on staging PET with SUVmax 6.8. Ddx includes lymphoma or primary thyroid malignancy. TSH normal.   - 2/13/24 FNA was benign (Jenkins II), most consistent with lymphocytic thyroiditis. Will monitor thyroid levels to see if he develops hyper or hypothyroidism.  - Plan to repeat thyroid U/S in 3 months. If not improving, would consider repeat biopsy with flow cytometry this time to look for CLL involvement.     #Elevated Cr  #Elevated uric acid  Cr 1.23 and uric acid 8.2 on 1/12/24. Baseline Cr appears to be ~1.1-1.2?  - Stable today, uric acid down to 7.3. Monitor.     #Hx of targetoid rash  Had target rash and possible tick bite in summer 2023.  - Lyme serologies returned negative.      #Ppx  IgG 996, no history of recurrent infections.  - Vaccines: Got RSV and Shingrix dose #1 on 1/16/24. Will need 2nd Shingrix dose next visit. He previously declined COVID booster.       PLAN:  - RTC in 3 months    Total of 30 minutes on patient visit, reviewing records, interpreting test results, placing orders, and documentation on the day of service.    Nai Meyer MD  Attending Physician, [facility]

## 2024-04-18 NOTE — LETTER
4/18/2024         RE: Kike Kee  [address]  [facility] [address]        Dear Colleague,    Thank you for referring your patient, Kike Kee, to the [facility]. Please see a copy of my visit note below.     [facility]      FOLLOW-UP VISIT NOTE                       Apr 18, 2024  Subjective  REASON FOR VISIT: Follow up CLL    ONCOLOGIC SUMMARY:  Diagnosis:  Chronic lymphocytic leukemia dx 12/2023, presenting with asymptomatic lymphocytosis (WBC 19.7, ALC 15). Hemoglobin and platelets normal. Peripheral flow with 22% CD5+ lambda-monotypic B-cells with immunophenotype of CLL/SLL. FISH with trisomy 12 and del 13q; no 17p deletion or IGH:CCND1 fusion. NGS negative for TP53 mutation. IGHV status indeterminate as two productive sequences were identified and one was mutated while the other was unmutated. PET with mildly FDG-avid bilateral axillary LAD, thyroid lesion, and borderline splenomegaly with no uptake.     Intent of treatment: Control    Treatment history:  1/2024 to present: observation    INTERVAL HISTORY:  Christiano is here today with his wife Elisa for follow-up. He denies any new symptoms since last visit including fevers, night sweats, lumps/bumps, SOB, N/V, bleeding, etc. He has some fatigue but still doing normal activities like golfing. Appetite is good.     I have reviewed and updated the following:  No past medical history on file.   No Known Allergies    Current Outpatient Medications:     atorvastatin (LIPITOR) 10 MG tablet, Take 10 mg by mouth daily, Disp: , Rfl:     fish oil-omega-3 fatty acids 1000 MG capsule, Take 2,000 mg by mouth, Disp: , Rfl:     glucosamine-chondroitin 500-400 MG CAPS per capsule, Take 1 capsule by mouth daily, Disp: , Rfl:     REVIEW OF SYSTEMS:  12-point ROS reviewed and negative other than that mentioned in HPI.     Objective  VITAL SIGNS:  /76   Pulse 65   Temp 97.8  F (36.6  C) (Oral)   Resp 16   Wt 100.5 kg  (221 lb 8 oz)   SpO2 95%     ECOG PS: 0     PHYSICAL EXAM:  General: Awake, alert, in no acute distress. Oriented x 3.  HEENT: Normocephalic, atraumatic. No scleral icterus.   Lymph: No cervical, supraclavicular, or axillary lymphadenopathy appreciated.   CV: Regular rate and rhythm. No murmurs, rubs, or gallops appreciated.  Resp: Good inspiratory effort, lungs clear to auscultation bilaterally.  GI: Abdomen soft, nontender, nondistended. No masses or organomegaly appreciated.   Ext: No peripheral edema bilaterally.  Neuro: CN II-XII grossly intact. No focal deficits.   Skin: No rash, unusual bruising or prominent lesions.  Psych: Pleasant, normal affect.    LABS:  I reviewed the following labs:  Lab Results   Component Value Date    WBC 27.2 (H) 04/18/2024    HGB 16.3 04/18/2024    HCT 47.6 04/18/2024    MCV 89 04/18/2024     04/18/2024     Lab Results   Component Value Date     04/18/2024    POTASSIUM 4.7 04/18/2024    CR 1.20 (H) 04/18/2024    BUN 19.7 04/18/2024    CHLORIDE 107 04/18/2024    JOSE 9.4 04/18/2024     (H) 04/18/2024      Lab Results   Component Value Date    ALT 18 04/18/2024    AST 27 04/18/2024    ALKPHOS 80 04/18/2024    BILITOTAL 0.6 04/18/2024      Lab Results   Component Value Date     04/18/2024    URIC 7.3 (H) 04/18/2024      Assessment & Plan  #CLL/SLL, Mayen stage 1, intermediate-risk  Dx 12/2023, found to have absolute lymphocytosis to 15 on routine CBC. Most recent ALC in 2019 was 4.6 so this evolved sometime in the last 4 years. Hemoglobin, platelets, and LDH are normal. He is largely asymptomatic with no B-symptoms. Peripheral flow with 22% CD5+ lambda-monotypic B-cells consistent with CLL/SLL. Intermediate risk with trisomy 12 and del 13q, no del 17p or TP53 mutation. IGHV status indeterminate. PET with axillary LAD and borderline splenomegaly (but not palpable on exam) so this is Mayen stage I. Other incidental findings below which may or may not be related.  He is currently on surveillance as there as no indications to start treatment.   - Labs and exam reassuring today. ALC stable ~19, Hgb and plts still normal. Continue surveillance.   - We reviewed symptoms to watch out for including fevers, night sweats, unintentional weight loss, or rapidly enlarging lymphadenopathy. Discussed that other indications for treatment would include anemia/thrombocytopenia or ALC doubling time <6 months, which he does not have currently.      #Thyroid mass  2.7 cm left thyroid lesion noted on staging PET with SUVmax 6.8. Ddx includes lymphoma or primary thyroid malignancy. TSH normal.   - 2/13/24 FNA was benign ([place] II), most consistent with lymphocytic thyroiditis. Will monitor thyroid levels to see if he develops hyper or hypothyroidism.  - Plan to repeat thyroid U/S in 3 months. If not improving, would consider repeat biopsy with flow cytometry this time to look for CLL involvement.     #Elevated Cr  #Elevated uric acid  Cr 1.23 and uric acid 8.2 on 1/12/24. Baseline Cr appears to be ~1.1-1.2?  - Stable today, uric acid down to 7.3. Monitor.     #Hx of targetoid rash  Had target rash and possible tick bite in summer 2023.  - Lyme serologies returned negative.      #Ppx  IgG 996, no history of recurrent infections.  - Vaccines: Got RSV and Shingrix dose #1 on 1/16/24. Will need 2nd Shingrix dose next visit. He previously declined COVID booster.       PLAN:  - RTC in 3 months    Total of 30 minutes on patient visit, reviewing records, interpreting test results, placing orders, and documentation on the day of service.    Nai Meyer MD  Attending Physician, [facility]

## 2024-04-18 NOTE — NURSING NOTE
Chief Complaint   Patient presents with    Blood Draw     Labs drawn via  by RN in lab.  VS taken       Labs collected from venipuncture by RN. Vitals taken. Checked in for appointment(s).    Melissa Dillon RN

## 2024-07-16 ENCOUNTER — LAB (OUTPATIENT)
Dept: LAB | Facility: CLINIC | Age: 74
End: 2024-07-16
Attending: INTERNAL MEDICINE
Payer: MEDICARE

## 2024-07-16 ENCOUNTER — ANCILLARY PROCEDURE (OUTPATIENT)
Dept: ULTRASOUND IMAGING | Facility: CLINIC | Age: 74
End: 2024-07-16
Attending: INTERNAL MEDICINE
Payer: MEDICARE

## 2024-07-16 DIAGNOSIS — E04.1 THYROID NODULE: ICD-10-CM

## 2024-07-16 DIAGNOSIS — C91.10 CLL (CHRONIC LYMPHOCYTIC LEUKEMIA) (H): ICD-10-CM

## 2024-07-16 LAB
ALBUMIN SERPL BCG-MCNC: 4.5 G/DL (ref 3.5–5.2)
ALP SERPL-CCNC: 90 U/L (ref 40–150)
ALT SERPL W P-5'-P-CCNC: 23 U/L (ref 0–70)
ANION GAP SERPL CALCULATED.3IONS-SCNC: 10 MMOL/L (ref 7–15)
AST SERPL W P-5'-P-CCNC: 24 U/L (ref 0–45)
BASOPHILS # BLD AUTO: ABNORMAL 10*3/UL
BASOPHILS # BLD MANUAL: 0 10E3/UL (ref 0–0.2)
BASOPHILS NFR BLD AUTO: ABNORMAL %
BASOPHILS NFR BLD MANUAL: 0 %
BILIRUB SERPL-MCNC: 0.3 MG/DL
BUN SERPL-MCNC: 20.4 MG/DL (ref 8–23)
CALCIUM SERPL-MCNC: 9.9 MG/DL (ref 8.8–10.4)
CHLORIDE SERPL-SCNC: 105 MMOL/L (ref 98–107)
CREAT SERPL-MCNC: 1.35 MG/DL (ref 0.67–1.17)
EGFRCR SERPLBLD CKD-EPI 2021: 55 ML/MIN/1.73M2
EOSINOPHIL # BLD AUTO: ABNORMAL 10*3/UL
EOSINOPHIL # BLD MANUAL: 0.3 10E3/UL (ref 0–0.7)
EOSINOPHIL NFR BLD AUTO: ABNORMAL %
EOSINOPHIL NFR BLD MANUAL: 1 %
ERYTHROCYTE [DISTWIDTH] IN BLOOD BY AUTOMATED COUNT: 13.2 % (ref 10–15)
GLUCOSE SERPL-MCNC: 97 MG/DL (ref 70–99)
HCO3 SERPL-SCNC: 26 MMOL/L (ref 22–29)
HCT VFR BLD AUTO: 46.8 % (ref 40–53)
HGB BLD-MCNC: 16 G/DL (ref 13.3–17.7)
IMM GRANULOCYTES # BLD: ABNORMAL 10*3/UL
IMM GRANULOCYTES NFR BLD: ABNORMAL %
LDH SERPL L TO P-CCNC: 203 U/L (ref 0–250)
LYMPHOCYTES # BLD AUTO: ABNORMAL 10*3/UL
LYMPHOCYTES # BLD MANUAL: 20.6 10E3/UL (ref 0.8–5.3)
LYMPHOCYTES NFR BLD AUTO: ABNORMAL %
LYMPHOCYTES NFR BLD MANUAL: 73 %
MCH RBC QN AUTO: 30.7 PG (ref 26.5–33)
MCHC RBC AUTO-ENTMCNC: 34.2 G/DL (ref 31.5–36.5)
MCV RBC AUTO: 90 FL (ref 78–100)
MONOCYTES # BLD AUTO: ABNORMAL 10*3/UL
MONOCYTES # BLD MANUAL: 0.8 10E3/UL (ref 0–1.3)
MONOCYTES NFR BLD AUTO: ABNORMAL %
MONOCYTES NFR BLD MANUAL: 3 %
NEUTROPHILS # BLD AUTO: ABNORMAL 10*3/UL
NEUTROPHILS # BLD MANUAL: 6.5 10E3/UL (ref 1.6–8.3)
NEUTROPHILS NFR BLD AUTO: ABNORMAL %
NEUTROPHILS NFR BLD MANUAL: 23 %
NRBC # BLD AUTO: 0 10E3/UL
NRBC BLD AUTO-RTO: 0 /100
PLAT MORPH BLD: ABNORMAL
PLATELET # BLD AUTO: 205 10E3/UL (ref 150–450)
POTASSIUM SERPL-SCNC: 4.2 MMOL/L (ref 3.4–5.3)
PROT SERPL-MCNC: 6.9 G/DL (ref 6.4–8.3)
RBC # BLD AUTO: 5.22 10E6/UL (ref 4.4–5.9)
RBC MORPH BLD: ABNORMAL
SODIUM SERPL-SCNC: 141 MMOL/L (ref 135–145)
URATE SERPL-MCNC: 7.4 MG/DL (ref 3.4–7)
WBC # BLD AUTO: 28.2 10E3/UL (ref 4–11)

## 2024-07-16 PROCEDURE — 85007 BL SMEAR W/DIFF WBC COUNT: CPT | Performed by: PATHOLOGY

## 2024-07-16 PROCEDURE — 76536 US EXAM OF HEAD AND NECK: CPT | Performed by: RADIOLOGY

## 2024-07-16 PROCEDURE — 80053 COMPREHEN METABOLIC PANEL: CPT | Performed by: PATHOLOGY

## 2024-07-16 PROCEDURE — 85027 COMPLETE CBC AUTOMATED: CPT | Performed by: PATHOLOGY

## 2024-07-16 PROCEDURE — 36415 COLL VENOUS BLD VENIPUNCTURE: CPT | Performed by: PATHOLOGY

## 2024-07-16 PROCEDURE — 84550 ASSAY OF BLOOD/URIC ACID: CPT | Performed by: PATHOLOGY

## 2024-07-16 PROCEDURE — 83615 LACTATE (LD) (LDH) ENZYME: CPT | Performed by: PATHOLOGY

## 2024-07-19 DIAGNOSIS — E04.1 THYROID NODULE: Primary | ICD-10-CM

## 2024-07-24 NOTE — PROGRESS NOTES
[facility]      FOLLOW-UP VISIT NOTE                       Jul 25, 2024  Subjective   REASON FOR VISIT: Follow up CLL    ONCOLOGIC SUMMARY:  Diagnosis:  Chronic lymphocytic leukemia dx 12/2023, presenting with asymptomatic lymphocytosis (WBC 19.7, ALC 15). Hemoglobin and platelets normal. Peripheral flow with 22% CD5+ lambda-monotypic B-cells with immunophenotype of CLL/SLL. FISH with trisomy 12 and del 13q; no 17p deletion or IGH:CCND1 fusion. NGS negative for TP53 mutation. IGHV status indeterminate as two productive sequences were identified and one was mutated while the other was unmutated. PET with mildly FDG-avid bilateral axillary LAD, thyroid lesion, and borderline splenomegaly with no uptake.     Intent of treatment: Control    Treatment history:  1/2024 to present: observation    INTERVAL HISTORY:  Christiano is here today by himself for follow-up. Feels great and denies any new symptoms since last visit including fevers, night sweats, lumps/bumps, SOB, N/V, bleeding, etc. Out golfing frequently this summer.      I have reviewed and updated the following:  No past medical history on file.   No Known Allergies    Current Outpatient Medications:     atorvastatin (LIPITOR) 10 MG tablet, Take 10 mg by mouth daily, Disp: , Rfl:     fish oil-omega-3 fatty acids 1000 MG capsule, Take 2,000 mg by mouth, Disp: , Rfl:     glucosamine-chondroitin 500-400 MG CAPS per capsule, Take 1 capsule by mouth daily, Disp: , Rfl:     REVIEW OF SYSTEMS:  12-point ROS reviewed and negative other than that mentioned in HPI.     Objective   VITAL SIGNS:  /76   Pulse 64   Temp 98  F (36.7  C)   Resp 16   Wt 97.5 kg (215 lb)   SpO2 97%     ECOG PS: 0     PHYSICAL EXAM:  General: Awake, alert, in no acute distress. Oriented x 3.  HEENT: Normocephalic, atraumatic. No scleral icterus.   Lymph: No cervical, supraclavicular, or axillary lymphadenopathy appreciated.   CV: Regular rate and rhythm. No murmurs, rubs, or gallops  appreciated.  Resp: Good inspiratory effort, lungs clear to auscultation bilaterally.  GI: Abdomen soft, nontender, nondistended. No masses or organomegaly appreciated.   Ext: No peripheral edema bilaterally.  Neuro: CN II-XII grossly intact. No focal deficits.   Skin: No rash, unusual bruising or prominent lesions.  Psych: Pleasant, normal affect.    LABS:  I reviewed the following labs:  Lab Results   Component Value Date    WBC 28.2 (H) 07/16/2024    HGB 16.0 07/16/2024    HCT 46.8 07/16/2024    MCV 90 07/16/2024     07/16/2024     Lab Results   Component Value Date     07/16/2024    POTASSIUM 4.2 07/16/2024    CR 1.35 (H) 07/16/2024    BUN 20.4 07/16/2024    CHLORIDE 105 07/16/2024    JOSE 9.9 07/16/2024    GLC 97 07/16/2024      Lab Results   Component Value Date    ALT 23 07/16/2024    AST 24 07/16/2024    ALKPHOS 90 07/16/2024    BILITOTAL 0.3 07/16/2024      Lab Results   Component Value Date     07/16/2024    URIC 7.4 (H) 07/16/2024      Assessment & Plan   #CLL/SLL, Mayen stage 1, intermediate-risk  Dx 12/2023, found to have absolute lymphocytosis to 15 on routine CBC. Most recent ALC in 2019 was 4.6 so this evolved sometime in the last 4 years. Hemoglobin, platelets, and LDH are normal. He is largely asymptomatic with no B-symptoms. Peripheral flow with 22% CD5+ lambda-monotypic B-cells consistent with CLL/SLL. Intermediate risk with trisomy 12 and del 13q, no del 17p or TP53 mutation. IGHV status indeterminate. PET with axillary LAD and borderline splenomegaly (but not palpable on exam) so this is Mayen stage I. Other incidental findings below which may or may not be related. He is currently on surveillance as there as no indications to start treatment.   - Labs and exam reassuring today. ALC stable ~20, Hgb and plts still normal. Continue surveillance.   - We previously reviewed symptoms to watch out for including fevers, night sweats, unintentional weight loss, or rapidly enlarging  lymphadenopathy. Discussed that other indications for treatment would include anemia/thrombocytopenia or ALC doubling time <6 months, which he does not have currently.      #Thyroid mass  2.7 cm left thyroid lesion noted on staging PET with SUVmax 6.8. Ddx includes lymphoma or primary thyroid malignancy. TSH normal.   - 2/13/24 FNA was benign ([place] II), most consistent with lymphocytic thyroiditis. Will monitor thyroid levels to see if he develops hyper or hypothyroidism.  - 7/16/24 repeat thyroid U/S showed slightly enlargement of the left thyroid nodule to 5.2 x 2.9 x 2.4 cm. Given persistence, will repeat FNA with flow cytometry and cell block this time to look for CLL involvement. If negative, will refer to Endocrine for further evaluation.     #Elevated Cr  #Elevated uric acid  Cr 1.23 and uric acid 8.2 on 1/12/24. Baseline Cr appears to be ~1.1-1.2?  - Stable today, uric acid down to 7.4. Monitor.      #Ppx  IgG 996, no history of recurrent infections.  - Vaccines: Up to date on Shingrix and RSV vaccine. Recommend annual flu shot this fall. He previously declined COVID booster.       PLAN:  - Repeat thyroid FNA with flow cytometry  - RTC in 3 months    Total of 30 minutes on patient visit, reviewing records, interpreting test results, placing orders, and documentation on the day of service.    Nai Meyer MD  Attending Physician, [facility]

## 2024-07-25 ENCOUNTER — ONCOLOGY VISIT (OUTPATIENT)
Dept: ONCOLOGY | Facility: CLINIC | Age: 74
End: 2024-07-25
Attending: INTERNAL MEDICINE
Payer: MEDICARE

## 2024-07-25 VITALS
DIASTOLIC BLOOD PRESSURE: 76 MMHG | OXYGEN SATURATION: 97 % | SYSTOLIC BLOOD PRESSURE: 134 MMHG | WEIGHT: 215 LBS | RESPIRATION RATE: 16 BRPM | TEMPERATURE: 98 F | HEART RATE: 64 BPM

## 2024-07-25 DIAGNOSIS — E04.1 THYROID NODULE: Primary | ICD-10-CM

## 2024-07-25 DIAGNOSIS — C91.10 CLL (CHRONIC LYMPHOCYTIC LEUKEMIA) (H): ICD-10-CM

## 2024-07-25 PROCEDURE — G0463 HOSPITAL OUTPT CLINIC VISIT: HCPCS | Performed by: INTERNAL MEDICINE

## 2024-07-25 PROCEDURE — 99214 OFFICE O/P EST MOD 30 MIN: CPT | Performed by: INTERNAL MEDICINE

## 2024-07-25 ASSESSMENT — PAIN SCALES - GENERAL: PAINLEVEL: NO PAIN (0)

## 2024-07-25 NOTE — NURSING NOTE
Oncology Rooming Note    July 25, 2024 1:25 PM   Kike Kee is a 74 year old male who presents for:    Chief Complaint   Patient presents with    Oncology Clinic Visit     Thyroid nodule      Initial Vitals: /76   Pulse 64   Temp 98  F (36.7  C)   Resp 16   Wt 97.5 kg (215 lb)   SpO2 97%  There is no height or weight on file to calculate BMI. There is no height or weight on file to calculate BSA.  No Pain (0) Comment: Data Unavailable   No LMP for male patient.  Allergies reviewed: Yes  Medications reviewed: Yes    Medications: Medication refills not needed today.  Pharmacy name entered into [employer]: [id number] - [place], [place] - [address]    Frailty Screening:   Is the patient here for a new oncology consult visit in cancer care? 2. No      Clinical concerns: no other complaints      Jose De Leon

## 2024-07-25 NOTE — LETTER
7/25/2024      Kike Kee  [address]  [facility] [place] [facility] [address]      Dear Colleague,    Thank you for referring your patient, Kike Kee, to the [facility]. Please see a copy of my visit note below.     [facility]      FOLLOW-UP VISIT NOTE                       Jul 25, 2024  Subjective  REASON FOR VISIT: Follow up CLL    ONCOLOGIC SUMMARY:  Diagnosis:  Chronic lymphocytic leukemia dx 12/2023, presenting with asymptomatic lymphocytosis (WBC 19.7, ALC 15). Hemoglobin and platelets normal. Peripheral flow with 22% CD5+ lambda-monotypic B-cells with immunophenotype of CLL/SLL. FISH with trisomy 12 and del 13q; no 17p deletion or IGH:CCND1 fusion. NGS negative for TP53 mutation. IGHV status indeterminate as two productive sequences were identified and one was mutated while the other was unmutated. PET with mildly FDG-avid bilateral axillary LAD, thyroid lesion, and borderline splenomegaly with no uptake.     Intent of treatment: Control    Treatment history:  1/2024 to present: observation    INTERVAL HISTORY:  Christiano is here today by himself for follow-up. Feels great and denies any new symptoms since last visit including fevers, night sweats, lumps/bumps, SOB, N/V, bleeding, etc. Out golfing frequently this summer.      I have reviewed and updated the following:  No past medical history on file.   No Known Allergies    Current Outpatient Medications:      atorvastatin (LIPITOR) 10 MG tablet, Take 10 mg by mouth daily, Disp: , Rfl:      fish oil-omega-3 fatty acids 1000 MG capsule, Take 2,000 mg by mouth, Disp: , Rfl:      glucosamine-chondroitin 500-400 MG CAPS per capsule, Take 1 capsule by mouth daily, Disp: , Rfl:     REVIEW OF SYSTEMS:  12-point ROS reviewed and negative other than that mentioned in HPI.     Objective  VITAL SIGNS:  /76   Pulse 64   Temp 98  F (36.7  C)   Resp 16   Wt 97.5 kg (215 lb)   SpO2 97%     ECOG PS: 0     PHYSICAL EXAM:  General:  Awake, alert, in no acute distress. Oriented x 3.  HEENT: Normocephalic, atraumatic. No scleral icterus.   Lymph: No cervical, supraclavicular, or axillary lymphadenopathy appreciated.   CV: Regular rate and rhythm. No murmurs, rubs, or gallops appreciated.  Resp: Good inspiratory effort, lungs clear to auscultation bilaterally.  GI: Abdomen soft, nontender, nondistended. No masses or organomegaly appreciated.   Ext: No peripheral edema bilaterally.  Neuro: CN II-XII grossly intact. No focal deficits.   Skin: No rash, unusual bruising or prominent lesions.  Psych: Pleasant, normal affect.    LABS:  I reviewed the following labs:  Lab Results   Component Value Date    WBC 28.2 (H) 07/16/2024    HGB 16.0 07/16/2024    HCT 46.8 07/16/2024    MCV 90 07/16/2024     07/16/2024     Lab Results   Component Value Date     07/16/2024    POTASSIUM 4.2 07/16/2024    CR 1.35 (H) 07/16/2024    BUN 20.4 07/16/2024    CHLORIDE 105 07/16/2024    JOSE 9.9 07/16/2024    GLC 97 07/16/2024      Lab Results   Component Value Date    ALT 23 07/16/2024    AST 24 07/16/2024    ALKPHOS 90 07/16/2024    BILITOTAL 0.3 07/16/2024      Lab Results   Component Value Date     07/16/2024    URIC 7.4 (H) 07/16/2024      Assessment & Plan  #CLL/SLL, Mayen stage 1, intermediate-risk  Dx 12/2023, found to have absolute lymphocytosis to 15 on routine CBC. Most recent ALC in 2019 was 4.6 so this evolved sometime in the last 4 years. Hemoglobin, platelets, and LDH are normal. He is largely asymptomatic with no B-symptoms. Peripheral flow with 22% CD5+ lambda-monotypic B-cells consistent with CLL/SLL. Intermediate risk with trisomy 12 and del 13q, no del 17p or TP53 mutation. IGHV status indeterminate. PET with axillary LAD and borderline splenomegaly (but not palpable on exam) so this is Mayen stage I. Other incidental findings below which may or may not be related. He is currently on surveillance as there as no indications to start  treatment.   - Labs and exam reassuring today. ALC stable ~20, Hgb and plts still normal. Continue surveillance.   - We previously reviewed symptoms to watch out for including fevers, night sweats, unintentional weight loss, or rapidly enlarging lymphadenopathy. Discussed that other indications for treatment would include anemia/thrombocytopenia or ALC doubling time <6 months, which he does not have currently.      #Thyroid mass  2.7 cm left thyroid lesion noted on staging PET with SUVmax 6.8. Ddx includes lymphoma or primary thyroid malignancy. TSH normal.   - 2/13/24 FNA was benign ([place] II), most consistent with lymphocytic thyroiditis. Will monitor thyroid levels to see if he develops hyper or hypothyroidism.  - 7/16/24 repeat thyroid U/S showed slightly enlargement of the left thyroid nodule to 5.2 x 2.9 x 2.4 cm. Given persistence, will repeat FNA with flow cytometry and cell block this time to look for CLL involvement. If negative, will refer to Endocrine for further evaluation.     #Elevated Cr  #Elevated uric acid  Cr 1.23 and uric acid 8.2 on 1/12/24. Baseline Cr appears to be ~1.1-1.2?  - Stable today, uric acid down to 7.4. Monitor.      #Ppx  IgG 996, no history of recurrent infections.  - Vaccines: Up to date on Shingrix and RSV vaccine. Recommend annual flu shot this fall. He previously declined COVID booster.       PLAN:  - Repeat thyroid FNA with flow cytometry  - RTC in 3 months    Total of 30 minutes on patient visit, reviewing records, interpreting test results, placing orders, and documentation on the day of service.    Nai Meyer MD  Attending Physician, [facility]       Again, thank you for allowing me to participate in the care of your patient.        Sincerely,        Nai Meyer MD

## 2024-09-03 DIAGNOSIS — E04.1 THYROID NODULE: ICD-10-CM

## 2024-09-03 DIAGNOSIS — C91.10 CLL (CHRONIC LYMPHOCYTIC LEUKEMIA) (H): Primary | ICD-10-CM

## 2024-09-05 ENCOUNTER — ANCILLARY PROCEDURE (OUTPATIENT)
Dept: ULTRASOUND IMAGING | Facility: CLINIC | Age: 74
End: 2024-09-05
Attending: INTERNAL MEDICINE
Payer: MEDICARE

## 2024-09-05 DIAGNOSIS — E04.1 THYROID NODULE: ICD-10-CM

## 2024-09-05 DIAGNOSIS — C91.10 CLL (CHRONIC LYMPHOCYTIC LEUKEMIA) (H): ICD-10-CM

## 2024-09-05 PROCEDURE — 88305 TISSUE EXAM BY PATHOLOGIST: CPT | Mod: 26 | Performed by: STUDENT IN AN ORGANIZED HEALTH CARE EDUCATION/TRAINING PROGRAM

## 2024-09-05 PROCEDURE — 88341 IMHCHEM/IMCYTCHM EA ADD ANTB: CPT | Mod: 26 | Performed by: STUDENT IN AN ORGANIZED HEALTH CARE EDUCATION/TRAINING PROGRAM

## 2024-09-05 PROCEDURE — 88185 FLOWCYTOMETRY/TC ADD-ON: CPT | Performed by: INTERNAL MEDICINE

## 2024-09-05 PROCEDURE — 88342 IMHCHEM/IMCYTCHM 1ST ANTB: CPT | Mod: 26 | Performed by: STUDENT IN AN ORGANIZED HEALTH CARE EDUCATION/TRAINING PROGRAM

## 2024-09-05 PROCEDURE — 88342 IMHCHEM/IMCYTCHM 1ST ANTB: CPT | Mod: TC | Performed by: INTERNAL MEDICINE

## 2024-09-05 PROCEDURE — 60100 BIOPSY OF THYROID: CPT | Mod: GC | Performed by: RADIOLOGY

## 2024-09-05 PROCEDURE — 88188 FLOWCYTOMETRY/READ 9-15: CPT | Performed by: STUDENT IN AN ORGANIZED HEALTH CARE EDUCATION/TRAINING PROGRAM

## 2024-09-05 PROCEDURE — 76942 ECHO GUIDE FOR BIOPSY: CPT | Mod: GC | Performed by: RADIOLOGY

## 2024-09-05 RX ORDER — LIDOCAINE HYDROCHLORIDE 10 MG/ML
5 INJECTION, SOLUTION INFILTRATION; PERINEURAL ONCE
Status: COMPLETED | OUTPATIENT
Start: 2024-09-05 | End: 2024-09-05

## 2024-09-05 RX ADMIN — LIDOCAINE HYDROCHLORIDE 3 ML: 10 INJECTION, SOLUTION INFILTRATION; PERINEURAL at 09:20

## 2024-09-06 LAB
PATH REPORT.COMMENTS IMP SPEC: ABNORMAL
PATH REPORT.COMMENTS IMP SPEC: YES
PATH REPORT.FINAL DX SPEC: ABNORMAL
PATH REPORT.MICROSCOPIC SPEC OTHER STN: ABNORMAL
PATH REPORT.RELEVANT HX SPEC: ABNORMAL

## 2024-09-09 LAB
PATH REPORT.COMMENTS IMP SPEC: ABNORMAL
PATH REPORT.COMMENTS IMP SPEC: YES
PATH REPORT.FINAL DX SPEC: ABNORMAL
PATH REPORT.GROSS SPEC: ABNORMAL
PATH REPORT.MICROSCOPIC SPEC OTHER STN: ABNORMAL
PATH REPORT.RELEVANT HX SPEC: ABNORMAL
PHOTO IMAGE: ABNORMAL

## 2024-09-13 ENCOUNTER — VIRTUAL VISIT (OUTPATIENT)
Dept: ONCOLOGY | Facility: CLINIC | Age: 74
End: 2024-09-13
Attending: INTERNAL MEDICINE
Payer: MEDICARE

## 2024-09-13 VITALS
SYSTOLIC BLOOD PRESSURE: 128 MMHG | BODY MASS INDEX: 26.18 KG/M2 | HEIGHT: 76 IN | DIASTOLIC BLOOD PRESSURE: 65 MMHG | WEIGHT: 215 LBS

## 2024-09-13 DIAGNOSIS — C91.10 CLL (CHRONIC LYMPHOCYTIC LEUKEMIA) (H): Primary | ICD-10-CM

## 2024-09-13 DIAGNOSIS — E04.1 THYROID NODULE: ICD-10-CM

## 2024-09-13 PROCEDURE — 99214 OFFICE O/P EST MOD 30 MIN: CPT | Mod: 95 | Performed by: INTERNAL MEDICINE

## 2024-09-13 PROCEDURE — G2211 COMPLEX E/M VISIT ADD ON: HCPCS | Mod: 95 | Performed by: INTERNAL MEDICINE

## 2024-09-13 ASSESSMENT — PAIN SCALES - GENERAL: PAINLEVEL: NO PAIN (0)

## 2024-09-13 NOTE — NURSING NOTE
[place] [facility] [place] [facility] [address]    Is the patient currently in the state of [place]? YES    Visit mode:VIDEO    If the visit is dropped, the patient can be reconnected by: VIDEO VISIT: Send to e-mail at: [email or web address]    Will anyone else be joining the visit? NO  (If patient encounters technical issues they should call [phone]    How would you like to obtain your AVS? MyChart    Are changes needed to the allergy or medication list? No    Are refills needed on medications prescribed by this physician? NO    Rooming Documentation:  Not applicable      Reason for visit: VINCE Krishnan VVF

## 2024-09-13 NOTE — LETTER
9/13/2024      Kike Kee  [address]  [facility] [address]      Dear Colleague,    Thank you for referring your patient, Kike Kee, to the [facility]. Please see a copy of my visit note below.    Virtual Visit Details    Type of service:  Video Visit   Video Start Time:  3:50 PM  Video End Time: 4:05 PM    Originating Location (pt. Location): Home    Distant Location (provider location):  On-site  Platform used for Video Visit: [facility]      FOLLOW-UP VISIT NOTE                       Sep 13, 2024  Subjective  REASON FOR VISIT: Follow up CLL    ONCOLOGIC SUMMARY:  Diagnosis:  Chronic lymphocytic leukemia dx 12/2023, presenting with asymptomatic lymphocytosis (WBC 19.7, ALC 15). Hemoglobin and platelets normal. Peripheral flow with 22% CD5+ lambda-monotypic B-cells with immunophenotype of CLL/SLL. FISH with trisomy 12 and del 13q; no 17p deletion or IGH:CCND1 fusion. NGS negative for TP53 mutation. IGHV status indeterminate as two productive sequences were identified and one was mutated while the other was unmutated. PET with mildly FDG-avid bilateral axillary LAD, thyroid lesion, and borderline splenomegaly with no uptake.     Intent of treatment: Control    Treatment history:  1/2024 to present: observation    INTERVAL HISTORY:  Christiano wu Elisa are seen by video today to review thyroid biopsy results. He is doing great overall, no new fevers, night sweats, lumps/bumps, dysphagia, SOB, N/V, bleeding. States that this thyroid biopsy went much better than the previous FNA. Energy and appetite are good.     I have reviewed and updated the following:  No past medical history on file.   No Known Allergies    Current Outpatient Medications:      atorvastatin (LIPITOR) 10 MG tablet, Take 10 mg by mouth daily, Disp: , Rfl:      fish oil-omega-3 fatty acids 1000 MG capsule, Take 2,000 mg by mouth, Disp: , Rfl:      glucosamine-chondroitin 500-400 MG CAPS  "per capsule, Take 1 capsule by mouth daily, Disp: , Rfl:     REVIEW OF SYSTEMS:  10-point ROS reviewed and negative other than that mentioned in HPI.     Objective  VITAL SIGNS:  /65   Ht 1.93 m (6' 4\")   Wt 97.5 kg (215 lb)   BMI 26.17 kg/m      ECOG PS: 0     PHYSICAL EXAM:  **Limited given video visit**  General: Patient appears well in no acute distress.   Skin: No rashes or lesions on visualized skin  Eyes: EOMI, no erythema, sclera icterus or discharge noted  Resp: Appears to be breathing comfortably without accessory muscle usage, speaking in full sentences, no cough  MSK: Appears to have normal range of motion based on visualized movements  Neurologic: No apparent tremors, facial movements symmetric  Psych: Affect bright, alert and oriented     LABS:  I reviewed the following labs:  Lab Results   Component Value Date    WBC 28.2 (H) 07/16/2024    HGB 16.0 07/16/2024    HCT 46.8 07/16/2024    MCV 90 07/16/2024     07/16/2024     Lab Results   Component Value Date     07/16/2024    POTASSIUM 4.2 07/16/2024    CR 1.35 (H) 07/16/2024    BUN 20.4 07/16/2024    CHLORIDE 105 07/16/2024    JOSE 9.9 07/16/2024    GLC 97 07/16/2024      Lab Results   Component Value Date    ALT 23 07/16/2024    AST 24 07/16/2024    ALKPHOS 90 07/16/2024    BILITOTAL 0.3 07/16/2024      Lab Results   Component Value Date     07/16/2024    URIC 7.4 (H) 07/16/2024 9/5/24 Thyroid biopsy:  A. THYROID, LEFT LOBE, BIOPSY:  - CD5 positive lambda monotypic B-cells (Flow cytometry)  - Fragments of benign thyroid tissue with prior procedure site changes  - See comment    The specimen does not show a morphologic correlate to the patient's flow cytometry findings (CD5 positive lambda monotypic B-cells). The difference between the morphology and the flow cytometry might be due to that the specimen sent for flow cytometry is different from the one sent to morphology, hence, the lack of morphologic correlate. "     Assessment & Plan  #CLL/SLL, Mayen stage 1, intermediate-risk  Dx 12/2023, found to have absolute lymphocytosis to 15 on routine CBC. Most recent ALC in 2019 was 4.6 so this evolved sometime in the last 4 years. Hemoglobin, platelets, and LDH are normal. He is largely asymptomatic with no B-symptoms. Peripheral flow with 22% CD5+ lambda-monotypic B-cells consistent with CLL/SLL. Intermediate risk with trisomy 12 and del 13q, no del 17p or TP53 mutation. IGHV status indeterminate. PET with axillary LAD and borderline splenomegaly (but not palpable on exam) so this is Mayen stage I. Other incidental findings below which may or may not be related. He is currently on surveillance as there as no indications to start treatment.   - Labs and exam reassuring. ALC stable ~20, Hgb and plts still normal. Continue surveillance.   - We previously reviewed symptoms to watch out for including fevers, night sweats, unintentional weight loss, or rapidly enlarging lymphadenopathy. Discussed that other indications for treatment would include anemia/thrombocytopenia or ALC doubling time <6 months, which he does not have currently.    - Unclear if thyroid mass is related to the CLL given discordant flow and morph results. Will discuss on a tumor board down the road.     #Thyroid mass  2.7 cm left thyroid lesion noted on staging PET with SUVmax 6.8. Ddx includes lymphoma or primary thyroid malignancy. TSH normal.   - 2/13/24 FNA was benign ([place] II), most consistent with lymphocytic thyroiditis. Will monitor thyroid levels to see if he develops hyper or hypothyroidism.  - 7/16/24 repeat thyroid U/S showed slightly enlargement of the left thyroid nodule to 5.2 x 2.9 x 2.4 cm. Given persistence, we obtained core biopsy instead of FNA on 9/5/24. Interestingly, the flow showed CD5+ lambda-monotypic B-cells consistent with his CLL/SLL but morphology just showed fragments of benign thyroid tissue. This could be due to either peripheral  contamination or different specimen sent for flow vs morph. Either way there was no evidence of transformation to DLBCL.   - As he is asymptomatic, will continue to monitor TSH levels and symptoms, periodic thyroid U/S.     #Elevated Cr  #Elevated uric acid  Cr 1.23 and uric acid 8.2 on 1/12/24. Baseline Cr appears to be ~1.1-1.2?  - Stable at 7.4. Monitor.      #Ppx  IgG 996, no history of recurrent infections.  - Vaccines: Up to date on Shingrix and RSV vaccine. Recommend annual flu shot this fall. He previously declined COVID booster.       PLAN:  - RTC as scheduled 10/24/24    Total of 30 minutes on patient visit, reviewing records, interpreting test results, placing orders, and documentation on the day of service.    The longitudinal plan of care for the diagnosis(es)/condition(s) as documented were addressed during this visit. Due to the added complexity in care, I will continue to support Christiano in the subsequent management and with ongoing continuity of care.     Nai Meyer MD  Attending Physician, [facility]       Again, thank you for allowing me to participate in the care of your patient.        Sincerely,        Nai Meyer MD

## 2024-09-13 NOTE — PROGRESS NOTES
"Virtual Visit Details    Type of service:  Video Visit   Video Start Time:  3:50 PM  Video End Time: 4:05 PM    Originating Location (pt. Location): Home    Distant Location (provider location):  On-site  Platform used for Video Visit: [facility]      FOLLOW-UP VISIT NOTE                       Sep 13, 2024  Subjective   REASON FOR VISIT: Follow up CLL    ONCOLOGIC SUMMARY:  Diagnosis:  Chronic lymphocytic leukemia dx 12/2023, presenting with asymptomatic lymphocytosis (WBC 19.7, ALC 15). Hemoglobin and platelets normal. Peripheral flow with 22% CD5+ lambda-monotypic B-cells with immunophenotype of CLL/SLL. FISH with trisomy 12 and del 13q; no 17p deletion or IGH:CCND1 fusion. NGS negative for TP53 mutation. IGHV status indeterminate as two productive sequences were identified and one was mutated while the other was unmutated. PET with mildly FDG-avid bilateral axillary LAD, thyroid lesion, and borderline splenomegaly with no uptake.     Intent of treatment: Control    Treatment history:  1/2024 to present: observation    INTERVAL HISTORY:  Yas are seen by video today to review thyroid biopsy results. He is doing great overall, no new fevers, night sweats, lumps/bumps, dysphagia, SOB, N/V, bleeding. States that this thyroid biopsy went much better than the previous FNA. Energy and appetite are good.     I have reviewed and updated the following:  No past medical history on file.   No Known Allergies    Current Outpatient Medications:     atorvastatin (LIPITOR) 10 MG tablet, Take 10 mg by mouth daily, Disp: , Rfl:     fish oil-omega-3 fatty acids 1000 MG capsule, Take 2,000 mg by mouth, Disp: , Rfl:     glucosamine-chondroitin 500-400 MG CAPS per capsule, Take 1 capsule by mouth daily, Disp: , Rfl:     REVIEW OF SYSTEMS:  10-point ROS reviewed and negative other than that mentioned in HPI.     Objective   VITAL SIGNS:  /65   Ht 1.93 m (6' 4\")   Wt 97.5 kg (215 lb)   BMI " 26.17 kg/m      ECOG PS: 0     PHYSICAL EXAM:  **Limited given video visit**  General: Patient appears well in no acute distress.   Skin: No rashes or lesions on visualized skin  Eyes: EOMI, no erythema, sclera icterus or discharge noted  Resp: Appears to be breathing comfortably without accessory muscle usage, speaking in full sentences, no cough  MSK: Appears to have normal range of motion based on visualized movements  Neurologic: No apparent tremors, facial movements symmetric  Psych: Affect bright, alert and oriented     LABS:  I reviewed the following labs:  Lab Results   Component Value Date    WBC 28.2 (H) 07/16/2024    HGB 16.0 07/16/2024    HCT 46.8 07/16/2024    MCV 90 07/16/2024     07/16/2024     Lab Results   Component Value Date     07/16/2024    POTASSIUM 4.2 07/16/2024    CR 1.35 (H) 07/16/2024    BUN 20.4 07/16/2024    CHLORIDE 105 07/16/2024    JOSE 9.9 07/16/2024    GLC 97 07/16/2024      Lab Results   Component Value Date    ALT 23 07/16/2024    AST 24 07/16/2024    ALKPHOS 90 07/16/2024    BILITOTAL 0.3 07/16/2024      Lab Results   Component Value Date     07/16/2024    URIC 7.4 (H) 07/16/2024 9/5/24 Thyroid biopsy:  A. THYROID, LEFT LOBE, BIOPSY:  - CD5 positive lambda monotypic B-cells (Flow cytometry)  - Fragments of benign thyroid tissue with prior procedure site changes  - See comment    The specimen does not show a morphologic correlate to the patient's flow cytometry findings (CD5 positive lambda monotypic B-cells). The difference between the morphology and the flow cytometry might be due to that the specimen sent for flow cytometry is different from the one sent to morphology, hence, the lack of morphologic correlate.     Assessment & Plan   #CLL/SLL, Mayen stage 1, intermediate-risk  Dx 12/2023, found to have absolute lymphocytosis to 15 on routine CBC. Most recent ALC in 2019 was 4.6 so this evolved sometime in the last 4 years. Hemoglobin, platelets, and LDH  are normal. He is largely asymptomatic with no B-symptoms. Peripheral flow with 22% CD5+ lambda-monotypic B-cells consistent with CLL/SLL. Intermediate risk with trisomy 12 and del 13q, no del 17p or TP53 mutation. IGHV status indeterminate. PET with axillary LAD and borderline splenomegaly (but not palpable on exam) so this is Mayen stage I. Other incidental findings below which may or may not be related. He is currently on surveillance as there as no indications to start treatment.   - Labs and exam reassuring. ALC stable ~20, Hgb and plts still normal. Continue surveillance.   - We previously reviewed symptoms to watch out for including fevers, night sweats, unintentional weight loss, or rapidly enlarging lymphadenopathy. Discussed that other indications for treatment would include anemia/thrombocytopenia or ALC doubling time <6 months, which he does not have currently.    - Unclear if thyroid mass is related to the CLL given discordant flow and morph results. Will discuss on a tumor board down the road.     #Thyroid mass  2.7 cm left thyroid lesion noted on staging PET with SUVmax 6.8. Ddx includes lymphoma or primary thyroid malignancy. TSH normal.   - 2/13/24 FNA was benign ([place] II), most consistent with lymphocytic thyroiditis. Will monitor thyroid levels to see if he develops hyper or hypothyroidism.  - 7/16/24 repeat thyroid U/S showed slightly enlargement of the left thyroid nodule to 5.2 x 2.9 x 2.4 cm. Given persistence, we obtained core biopsy instead of FNA on 9/5/24. Interestingly, the flow showed CD5+ lambda-monotypic B-cells consistent with his CLL/SLL but morphology just showed fragments of benign thyroid tissue. This could be due to either peripheral contamination or different specimen sent for flow vs morph. Either way there was no evidence of transformation to DLBCL.   - As he is asymptomatic, will continue to monitor TSH levels and symptoms, periodic thyroid U/S.     #Elevated Cr  #Elevated  uric acid  Cr 1.23 and uric acid 8.2 on 1/12/24. Baseline Cr appears to be ~1.1-1.2?  - Stable at 7.4. Monitor.      #Ppx  IgG 996, no history of recurrent infections.  - Vaccines: Up to date on Shingrix and RSV vaccine. Recommend annual flu shot this fall. He previously declined COVID booster.       PLAN:  - RTC as scheduled 10/24/24    Total of 30 minutes on patient visit, reviewing records, interpreting test results, placing orders, and documentation on the day of service.    The longitudinal plan of care for the diagnosis(es)/condition(s) as documented were addressed during this visit. Due to the added complexity in care, I will continue to support Christiano in the subsequent management and with ongoing continuity of care.     Nai Meyer MD  Attending Physician, [facility]

## 2024-10-02 PROBLEM — C91.10 CLL (CHRONIC LYMPHOCYTIC LEUKEMIA) (H): Status: ACTIVE | Noted: 2024-10-02

## 2024-10-02 PROBLEM — E04.1 THYROID NODULE: Status: ACTIVE | Noted: 2024-10-02

## 2024-10-03 ENCOUNTER — MYC MEDICAL ADVICE (OUTPATIENT)
Dept: ONCOLOGY | Facility: CLINIC | Age: 74
End: 2024-10-03
Payer: MEDICARE

## 2024-10-03 NOTE — TELEPHONE ENCOUNTER
Oncology Nurse Triage - Reporting Symptoms    Situation:   Kike reporting sore throat via MyC message. Triage RN call to pt to follow up.     Background:   Treating Provider:   Dr. Nai Meyer    Date of last office visit: 9/13/24    Recent treatments: Yes: 9/5/24 thyroid fine needle aspiration    Assessment  Onset of symptoms: 10 days   Sore throat- has all the time, but worse with swallowing. Also reports a wet cough- has not spit out phlegm, just clears throat. Sore and tender. Able to swallow without difficulty. Denies feeling any swelling.   Denies nasal congestion, headaches, night sweats, fevers/chills, SOB, chest pain.   Recently returned from from [place], [place], where they did a lot of hiking.   No one else in home has sore throat or cold symptoms.     Recommendations:   Encouraged PO fluids, okay to suck on cough drop, monitor for fever above 100.4, chills, worsening cough, changes in mucus color and when to call triage. Informed will send message to care team and someone will contact him if there are additional recommendations. Pt voiced understanding.

## 2024-10-04 NOTE — TELEPHONE ENCOUNTER
LVM for pt advising per Dr. Meyer, to go to  to get tested for strep, COVID, and respiratory viral panel. MyC message also sent with recommendations and call back number for triage.

## 2024-10-24 ENCOUNTER — LAB (OUTPATIENT)
Dept: LAB | Facility: CLINIC | Age: 74
End: 2024-10-24
Attending: INTERNAL MEDICINE
Payer: MEDICARE

## 2024-10-24 ENCOUNTER — ONCOLOGY VISIT (OUTPATIENT)
Dept: ONCOLOGY | Facility: CLINIC | Age: 74
End: 2024-10-24
Attending: INTERNAL MEDICINE
Payer: MEDICARE

## 2024-10-24 VITALS
DIASTOLIC BLOOD PRESSURE: 80 MMHG | RESPIRATION RATE: 16 BRPM | BODY MASS INDEX: 26.45 KG/M2 | WEIGHT: 217.3 LBS | TEMPERATURE: 98.2 F | HEART RATE: 66 BPM | OXYGEN SATURATION: 95 % | SYSTOLIC BLOOD PRESSURE: 135 MMHG

## 2024-10-24 DIAGNOSIS — E04.1 THYROID NODULE: ICD-10-CM

## 2024-10-24 DIAGNOSIS — C91.10 CLL (CHRONIC LYMPHOCYTIC LEUKEMIA) (H): ICD-10-CM

## 2024-10-24 DIAGNOSIS — C91.10 CLL (CHRONIC LYMPHOCYTIC LEUKEMIA) (H): Primary | ICD-10-CM

## 2024-10-24 LAB
ALBUMIN SERPL BCG-MCNC: 4.6 G/DL (ref 3.5–5.2)
ALP SERPL-CCNC: 83 U/L (ref 40–150)
ALT SERPL W P-5'-P-CCNC: 22 U/L (ref 0–70)
ANION GAP SERPL CALCULATED.3IONS-SCNC: 11 MMOL/L (ref 7–15)
AST SERPL W P-5'-P-CCNC: 25 U/L (ref 0–45)
BASOPHILS # BLD MANUAL: 0.6 10E3/UL (ref 0–0.2)
BASOPHILS NFR BLD MANUAL: 2 %
BILIRUB SERPL-MCNC: 0.6 MG/DL
BUN SERPL-MCNC: 19.1 MG/DL (ref 8–23)
CALCIUM SERPL-MCNC: 9.8 MG/DL (ref 8.8–10.4)
CHLORIDE SERPL-SCNC: 106 MMOL/L (ref 98–107)
CREAT SERPL-MCNC: 1.15 MG/DL (ref 0.67–1.17)
EGFRCR SERPLBLD CKD-EPI 2021: 67 ML/MIN/1.73M2
EOSINOPHIL # BLD MANUAL: 0.6 10E3/UL (ref 0–0.7)
EOSINOPHIL NFR BLD MANUAL: 2 %
ERYTHROCYTE [DISTWIDTH] IN BLOOD BY AUTOMATED COUNT: 13.2 % (ref 10–15)
GLUCOSE SERPL-MCNC: 80 MG/DL (ref 70–99)
HCO3 SERPL-SCNC: 23 MMOL/L (ref 22–29)
HCT VFR BLD AUTO: 48.4 % (ref 40–53)
HGB BLD-MCNC: 16.3 G/DL (ref 13.3–17.7)
LDH SERPL L TO P-CCNC: 185 U/L (ref 0–250)
LYMPHOCYTES # BLD MANUAL: 20.7 10E3/UL (ref 0.8–5.3)
LYMPHOCYTES NFR BLD MANUAL: 73 %
MCH RBC QN AUTO: 30.4 PG (ref 26.5–33)
MCHC RBC AUTO-ENTMCNC: 33.7 G/DL (ref 31.5–36.5)
MCV RBC AUTO: 90 FL (ref 78–100)
MONOCYTES # BLD MANUAL: 0.8 10E3/UL (ref 0–1.3)
MONOCYTES NFR BLD MANUAL: 3 %
NEUTROPHILS # BLD MANUAL: 5.7 10E3/UL (ref 1.6–8.3)
NEUTROPHILS NFR BLD MANUAL: 20 %
PLAT MORPH BLD: ABNORMAL
PLATELET # BLD AUTO: 229 10E3/UL (ref 150–450)
POTASSIUM SERPL-SCNC: 4.3 MMOL/L (ref 3.4–5.3)
PROT SERPL-MCNC: 7.1 G/DL (ref 6.4–8.3)
RBC # BLD AUTO: 5.37 10E6/UL (ref 4.4–5.9)
RBC MORPH BLD: ABNORMAL
SODIUM SERPL-SCNC: 140 MMOL/L (ref 135–145)
TSH SERPL DL<=0.005 MIU/L-ACNC: 1.06 UIU/ML (ref 0.3–4.2)
URATE SERPL-MCNC: 6.8 MG/DL (ref 3.4–7)
WBC # BLD AUTO: 28.3 10E3/UL (ref 4–11)

## 2024-10-24 PROCEDURE — G2211 COMPLEX E/M VISIT ADD ON: HCPCS | Performed by: INTERNAL MEDICINE

## 2024-10-24 PROCEDURE — 36415 COLL VENOUS BLD VENIPUNCTURE: CPT | Performed by: PATHOLOGY

## 2024-10-24 PROCEDURE — 99213 OFFICE O/P EST LOW 20 MIN: CPT | Performed by: INTERNAL MEDICINE

## 2024-10-24 PROCEDURE — 80053 COMPREHEN METABOLIC PANEL: CPT | Performed by: PATHOLOGY

## 2024-10-24 PROCEDURE — 84443 ASSAY THYROID STIM HORMONE: CPT | Performed by: PATHOLOGY

## 2024-10-24 PROCEDURE — G0463 HOSPITAL OUTPT CLINIC VISIT: HCPCS | Performed by: INTERNAL MEDICINE

## 2024-10-24 PROCEDURE — 85027 COMPLETE CBC AUTOMATED: CPT | Performed by: PATHOLOGY

## 2024-10-24 PROCEDURE — 84550 ASSAY OF BLOOD/URIC ACID: CPT | Performed by: PATHOLOGY

## 2024-10-24 PROCEDURE — 83615 LACTATE (LD) (LDH) ENZYME: CPT | Performed by: PATHOLOGY

## 2024-10-24 PROCEDURE — 85007 BL SMEAR W/DIFF WBC COUNT: CPT | Performed by: PATHOLOGY

## 2024-10-24 ASSESSMENT — PAIN SCALES - GENERAL: PAINLEVEL_OUTOF10: NO PAIN (0)

## 2024-10-24 NOTE — PROGRESS NOTES
[facility]      FOLLOW-UP VISIT NOTE                       Oct 24, 2024  Subjective   REASON FOR VISIT: Follow up CLL    ONCOLOGIC SUMMARY:  Diagnosis:  Chronic lymphocytic leukemia dx 12/2023, presenting with asymptomatic lymphocytosis (WBC 19.7, ALC 15). Hemoglobin and platelets normal. Peripheral flow with 22% CD5+ lambda-monotypic B-cells with immunophenotype of CLL/SLL. FISH with trisomy 12 and del 13q; no 17p deletion or IGH:CCND1 fusion. NGS negative for TP53 mutation. IGHV status indeterminate as two productive sequences were identified and one was mutated while the other was unmutated. PET with mildly FDG-avid bilateral axillary LAD, thyroid lesion, and borderline splenomegaly with no uptake.     Intent of treatment: Control    Treatment history:  1/2024 to present: observation    INTERVAL HISTORY:  Christiano is here today for follow-up with wife Elisa. Doing well overall. Still has a little throat pain with swallowing (prior strep/other infectious testing was negative) but no dysphagia or difficulty breathing. Also denies fevers, night sweats, lumps/bumps, N/V, bleeding. Energy and appetite are good, still walking a lot and playing golf.     I have reviewed and updated the following:  No past medical history on file.   No Known Allergies    Current Outpatient Medications:     atorvastatin (LIPITOR) 10 MG tablet, Take 10 mg by mouth daily, Disp: , Rfl:     fish oil-omega-3 fatty acids 1000 MG capsule, Take 2,000 mg by mouth daily., Disp: , Rfl:     glucosamine-chondroitin 500-400 MG CAPS per capsule, Take 2 capsules by mouth daily., Disp: , Rfl:     REVIEW OF SYSTEMS:  10-point ROS reviewed and negative other than that mentioned in HPI.     Objective   VITAL SIGNS:  /80 (BP Location: Right arm, Patient Position: Sitting, Cuff Size: Adult Regular)   Pulse 66   Temp 98.2  F (36.8  C) (Oral)   Resp 16   Wt 98.6 kg (217 lb 4.8 oz)   SpO2 95%   BMI 26.45 kg/m      ECOG PS: 0     PHYSICAL  EXAM:  General: Awake, alert, in no acute distress. Oriented x 3.  HEENT: Normocephalic, atraumatic. No scleral icterus.   Lymph: Soft subcm left anterior cervical LN. No supraclavicular or axillary lymphadenopathy appreciated.   CV: Regular rate and rhythm. No murmurs, rubs, or gallops appreciated.  Resp: Good inspiratory effort, lungs clear to auscultation bilaterally.  GI: Abdomen soft, nontender, nondistended.   Ext: No peripheral edema bilaterally.  Neuro: CN II-XII grossly intact. No focal deficits.   Skin: No rash, unusual bruising or prominent lesions.  Psych: Pleasant, normal affect.    LABS:  I reviewed the following labs:  Lab Results   Component Value Date    WBC 28.3 (H) 10/24/2024    HGB 16.3 10/24/2024    HCT 48.4 10/24/2024    MCV 90 10/24/2024     10/24/2024     Lab Results   Component Value Date     10/24/2024    POTASSIUM 4.3 10/24/2024    CR 1.15 10/24/2024    BUN 19.1 10/24/2024    CHLORIDE 106 10/24/2024    JOSE 9.8 10/24/2024    GLC 80 10/24/2024      Lab Results   Component Value Date    ALT 22 10/24/2024    AST 25 10/24/2024    ALKPHOS 83 10/24/2024    BILITOTAL 0.6 10/24/2024      Lab Results   Component Value Date     10/24/2024    URIC 6.8 10/24/2024       Assessment & Plan   #CLL/SLL, Mayen stage 1, intermediate-risk  Dx 12/2023, found to have absolute lymphocytosis to 15 on routine CBC. Most recent ALC in 2019 was 4.6 so this evolved sometime in the last 4 years. Hemoglobin, platelets, and LDH are normal. He is asymptomatic with no B-symptoms. Peripheral flow with 22% CD5+ lambda-monotypic B-cells consistent with CLL/SLL. Intermediate risk with trisomy 12 and del 13q, no del 17p or TP53 mutation. IGHV status indeterminate. PET with axillary LAD and borderline splenomegaly (but not palpable on exam) so this is Mayen stage I. Other incidental findings below which may or may not be related. He is currently on surveillance as there as no indications to start treatment.   -  Labs and exam reassuring today. ALC stable ~20, Hgb and plts still normal. Continue surveillance.   - We previously reviewed symptoms to watch out for including fevers, night sweats, unintentional weight loss, or rapidly enlarging lymphadenopathy. Discussed that other indications for treatment would include anemia/thrombocytopenia or ALC doubling time <6 months, which he does not have currently.    - Unclear if thyroid mass is related to the CLL given discordant flow and morph results. Will monitor closely.     #Thyroid mass  2.7 cm left thyroid lesion noted on staging PET with SUVmax 6.8. Ddx includes lymphoma or primary thyroid malignancy. TSH normal.   - 2/13/24 FNA was benign ([place] II), most consistent with lymphocytic thyroiditis. Will monitor thyroid levels to see if he develops hyper or hypothyroidism.  - 7/16/24 repeat thyroid U/S showed slightly enlargement of the left thyroid nodule to 5.2 x 2.9 x 2.4 cm. Given persistence, we obtained core biopsy instead of FNA on 9/5/24. Interestingly, the flow showed CD5+ lambda-monotypic B-cells consistent with his CLL/SLL but morphology just showed fragments of benign thyroid tissue. This could be due to either peripheral contamination or different specimen sent for flow vs morph. Either way there was no evidence of transformation to DLBCL.   - As he is asymptomatic, will continue to monitor TSH levels and symptoms, periodic thyroid U/S (next in 1/2025).     #Odynophagia  Notes some pain with swallowing over last month, no pain in mouth/mucositis or dysphagia. Possibly related to thyroid mass? Strep/COVID/flu swab negative.  - Monitor, consider CT neck or ENT eval if not improving.     #Elevated Cr  #Elevated uric acid  Cr 1.23 and uric acid 8.2 on 1/12/24. Baseline Cr appears to be ~1.1-1.2?  - Both improved today, Cr 1.15 and uric acid 6.8.      #Ppx  IgG 996, no history of recurrent infections.  - Vaccines: Up to date on Shingrix and RSV vaccine. Recommend  annual flu shot this fall. He previously declined COVID booster.       PLAN:  - RTC in 3 months with thyroid U/S    Total of 25 minutes on patient visit, reviewing records, interpreting test results, placing orders, and documentation on the day of service.    The longitudinal plan of care for the diagnosis(es)/condition(s) as documented were addressed during this visit. Due to the added complexity in care, I will continue to support Christiano in the subsequent management and with ongoing continuity of care.     Nai Meyer MD  Attending Physician, [facility]

## 2024-10-24 NOTE — LETTER
10/24/2024      Kike Kee  [address]  [facility] [address]      Dear Colleague,    Thank you for referring your patient, Kike Kee, to the [facility]. Please see a copy of my visit note below.      [facility]      FOLLOW-UP VISIT NOTE                       Oct 24, 2024  Subjective  REASON FOR VISIT: Follow up CLL    ONCOLOGIC SUMMARY:  Diagnosis:  Chronic lymphocytic leukemia dx 12/2023, presenting with asymptomatic lymphocytosis (WBC 19.7, ALC 15). Hemoglobin and platelets normal. Peripheral flow with 22% CD5+ lambda-monotypic B-cells with immunophenotype of CLL/SLL. FISH with trisomy 12 and del 13q; no 17p deletion or IGH:CCND1 fusion. NGS negative for TP53 mutation. IGHV status indeterminate as two productive sequences were identified and one was mutated while the other was unmutated. PET with mildly FDG-avid bilateral axillary LAD, thyroid lesion, and borderline splenomegaly with no uptake.     Intent of treatment: Control    Treatment history:  1/2024 to present: observation    INTERVAL HISTORY:  Christiano is here today for follow-up with wife Elisa. Doing well overall. Still has a little throat pain with swallowing (prior strep/other infectious testing was negative) but no dysphagia or difficulty breathing. Also denies fevers, night sweats, lumps/bumps, N/V, bleeding. Energy and appetite are good, still walking a lot and playing golf.     I have reviewed and updated the following:  No past medical history on file.   No Known Allergies    Current Outpatient Medications:      atorvastatin (LIPITOR) 10 MG tablet, Take 10 mg by mouth daily, Disp: , Rfl:      fish oil-omega-3 fatty acids 1000 MG capsule, Take 2,000 mg by mouth daily., Disp: , Rfl:      glucosamine-chondroitin 500-400 MG CAPS per capsule, Take 2 capsules by mouth daily., Disp: , Rfl:     REVIEW OF SYSTEMS:  10-point ROS reviewed and negative other than that mentioned in HPI.     Objective  VITAL  SIGNS:  /80 (BP Location: Right arm, Patient Position: Sitting, Cuff Size: Adult Regular)   Pulse 66   Temp 98.2  F (36.8  C) (Oral)   Resp 16   Wt 98.6 kg (217 lb 4.8 oz)   SpO2 95%   BMI 26.45 kg/m      ECOG PS: 0     PHYSICAL EXAM:  General: Awake, alert, in no acute distress. Oriented x 3.  HEENT: Normocephalic, atraumatic. No scleral icterus.   Lymph: Soft subcm left anterior cervical LN. No supraclavicular or axillary lymphadenopathy appreciated.   CV: Regular rate and rhythm. No murmurs, rubs, or gallops appreciated.  Resp: Good inspiratory effort, lungs clear to auscultation bilaterally.  GI: Abdomen soft, nontender, nondistended.   Ext: No peripheral edema bilaterally.  Neuro: CN II-XII grossly intact. No focal deficits.   Skin: No rash, unusual bruising or prominent lesions.  Psych: Pleasant, normal affect.    LABS:  I reviewed the following labs:  Lab Results   Component Value Date    WBC 28.3 (H) 10/24/2024    HGB 16.3 10/24/2024    HCT 48.4 10/24/2024    MCV 90 10/24/2024     10/24/2024     Lab Results   Component Value Date     10/24/2024    POTASSIUM 4.3 10/24/2024    CR 1.15 10/24/2024    BUN 19.1 10/24/2024    CHLORIDE 106 10/24/2024    JOSE 9.8 10/24/2024    GLC 80 10/24/2024      Lab Results   Component Value Date    ALT 22 10/24/2024    AST 25 10/24/2024    ALKPHOS 83 10/24/2024    BILITOTAL 0.6 10/24/2024      Lab Results   Component Value Date     10/24/2024    URIC 6.8 10/24/2024       Assessment & Plan  #CLL/SLL, Mayen stage 1, intermediate-risk  Dx 12/2023, found to have absolute lymphocytosis to 15 on routine CBC. Most recent ALC in 2019 was 4.6 so this evolved sometime in the last 4 years. Hemoglobin, platelets, and LDH are normal. He is asymptomatic with no B-symptoms. Peripheral flow with 22% CD5+ lambda-monotypic B-cells consistent with CLL/SLL. Intermediate risk with trisomy 12 and del 13q, no del 17p or TP53 mutation. IGHV status indeterminate. PET with  axillary LAD and borderline splenomegaly (but not palpable on exam) so this is Mayen stage I. Other incidental findings below which may or may not be related. He is currently on surveillance as there as no indications to start treatment.   - Labs and exam reassuring today. ALC stable ~20, Hgb and plts still normal. Continue surveillance.   - We previously reviewed symptoms to watch out for including fevers, night sweats, unintentional weight loss, or rapidly enlarging lymphadenopathy. Discussed that other indications for treatment would include anemia/thrombocytopenia or ALC doubling time <6 months, which he does not have currently.    - Unclear if thyroid mass is related to the CLL given discordant flow and morph results. Will monitor closely.     #Thyroid mass  2.7 cm left thyroid lesion noted on staging PET with SUVmax 6.8. Ddx includes lymphoma or primary thyroid malignancy. TSH normal.   - 2/13/24 FNA was benign ([place] II), most consistent with lymphocytic thyroiditis. Will monitor thyroid levels to see if he develops hyper or hypothyroidism.  - 7/16/24 repeat thyroid U/S showed slightly enlargement of the left thyroid nodule to 5.2 x 2.9 x 2.4 cm. Given persistence, we obtained core biopsy instead of FNA on 9/5/24. Interestingly, the flow showed CD5+ lambda-monotypic B-cells consistent with his CLL/SLL but morphology just showed fragments of benign thyroid tissue. This could be due to either peripheral contamination or different specimen sent for flow vs morph. Either way there was no evidence of transformation to DLBCL.   - As he is asymptomatic, will continue to monitor TSH levels and symptoms, periodic thyroid U/S (next in 1/2025).     #Odynophagia  Notes some pain with swallowing over last month, no pain in mouth/mucositis or dysphagia. Possibly related to thyroid mass? Strep/COVID/flu swab negative.  - Monitor, consider CT neck or ENT eval if not improving.     #Elevated Cr  #Elevated uric acid  Cr 1.23  and uric acid 8.2 on 1/12/24. Baseline Cr appears to be ~1.1-1.2?  - Both improved today, Cr 1.15 and uric acid 6.8.      #Ppx  IgG 996, no history of recurrent infections.  - Vaccines: Up to date on Shingrix and RSV vaccine. Recommend annual flu shot this fall. He previously declined COVID booster.       PLAN:  - RTC in 3 months with thyroid U/S    Total of 25 minutes on patient visit, reviewing records, interpreting test results, placing orders, and documentation on the day of service.    The longitudinal plan of care for the diagnosis(es)/condition(s) as documented were addressed during this visit. Due to the added complexity in care, I will continue to support Christiano in the subsequent management and with ongoing continuity of care.     Nai Meyer MD  Attending Physician, [facility]       Again, thank you for allowing me to participate in the care of your patient.        Sincerely,        Nai Meyer MD

## 2024-10-24 NOTE — NURSING NOTE
"Oncology Rooming Note    October 24, 2024 12:55 PM   Kike Kee is a 74 year old male who presents for:    Chief Complaint   Patient presents with    Oncology Clinic Visit     CLL (chronic lymphocytic leukemia)     Initial Vitals: /80 (BP Location: Right arm, Patient Position: Sitting, Cuff Size: Adult Regular)   Pulse 66   Temp 98.2  F (36.8  C) (Oral)   Resp 16   Wt 98.6 kg (217 lb 4.8 oz)   SpO2 95%   BMI 26.45 kg/m   Estimated body mass index is 26.45 kg/m  as calculated from the following:    Height as of 9/13/24: 1.93 m (6' 4\").    Weight as of this encounter: 98.6 kg (217 lb 4.8 oz). Body surface area is 2.3 meters squared.  No Pain (0) Comment: Data Unavailable   No LMP for male patient.  Allergies reviewed: Yes  Medications reviewed: Yes    Medications: Medication refills not needed today.  Pharmacy name entered into [facility]: [id number] - [place], [place] - [address] [place] [address]    Frailty Screening:   Is the patient here for a new oncology consult visit in cancer care? 2. No      Clinical concerns: Patient states no new concerns to discuss with provider.       Aftab Santos EMT            "

## 2025-01-21 ENCOUNTER — ANCILLARY PROCEDURE (OUTPATIENT)
Dept: ULTRASOUND IMAGING | Facility: CLINIC | Age: 75
End: 2025-01-21
Attending: INTERNAL MEDICINE
Payer: MEDICARE

## 2025-01-21 DIAGNOSIS — E04.1 THYROID NODULE: ICD-10-CM

## 2025-01-21 PROCEDURE — 76536 US EXAM OF HEAD AND NECK: CPT | Mod: GC | Performed by: RADIOLOGY

## 2025-01-24 ENCOUNTER — APPOINTMENT (OUTPATIENT)
Dept: LAB | Facility: CLINIC | Age: 75
End: 2025-01-24
Attending: INTERNAL MEDICINE
Payer: MEDICARE

## 2025-01-26 ENCOUNTER — HEALTH MAINTENANCE LETTER (OUTPATIENT)
Age: 75
End: 2025-01-26

## 2025-07-24 ENCOUNTER — APPOINTMENT (OUTPATIENT)
Dept: LAB | Facility: CLINIC | Age: 75
End: 2025-07-24
Attending: INTERNAL MEDICINE
Payer: MEDICARE

## 2025-07-24 ENCOUNTER — ONCOLOGY VISIT (OUTPATIENT)
Dept: ONCOLOGY | Facility: CLINIC | Age: 75
End: 2025-07-24
Attending: INTERNAL MEDICINE
Payer: MEDICARE

## 2025-07-24 VITALS
OXYGEN SATURATION: 95 % | RESPIRATION RATE: 16 BRPM | SYSTOLIC BLOOD PRESSURE: 136 MMHG | BODY MASS INDEX: 26.51 KG/M2 | DIASTOLIC BLOOD PRESSURE: 81 MMHG | TEMPERATURE: 97.9 F | WEIGHT: 217.8 LBS | HEART RATE: 55 BPM

## 2025-07-24 DIAGNOSIS — E04.1 THYROID NODULE: ICD-10-CM

## 2025-07-24 DIAGNOSIS — C91.10 CLL (CHRONIC LYMPHOCYTIC LEUKEMIA) (H): Primary | ICD-10-CM

## 2025-07-24 LAB
ALBUMIN SERPL BCG-MCNC: 4.6 G/DL (ref 3.5–5.2)
ALP SERPL-CCNC: 94 U/L (ref 40–150)
ALT SERPL W P-5'-P-CCNC: 26 U/L (ref 0–70)
ANION GAP SERPL CALCULATED.3IONS-SCNC: 13 MMOL/L (ref 7–15)
AST SERPL W P-5'-P-CCNC: 30 U/L (ref 0–45)
BASOPHILS # BLD MANUAL: 0 10E3/UL (ref 0–0.2)
BASOPHILS NFR BLD MANUAL: 0 %
BILIRUB SERPL-MCNC: 0.4 MG/DL
BUN SERPL-MCNC: 17.8 MG/DL (ref 8–23)
CALCIUM SERPL-MCNC: 10 MG/DL (ref 8.8–10.4)
CHLORIDE SERPL-SCNC: 104 MMOL/L (ref 98–107)
CREAT SERPL-MCNC: 1.29 MG/DL (ref 0.67–1.17)
EGFRCR SERPLBLD CKD-EPI 2021: 58 ML/MIN/1.73M2
EOSINOPHIL # BLD MANUAL: 0 10E3/UL (ref 0–0.7)
EOSINOPHIL NFR BLD MANUAL: 0 %
ERYTHROCYTE [DISTWIDTH] IN BLOOD BY AUTOMATED COUNT: 13.3 % (ref 10–15)
GLUCOSE SERPL-MCNC: 91 MG/DL (ref 70–99)
HCO3 SERPL-SCNC: 24 MMOL/L (ref 22–29)
HCT VFR BLD AUTO: 49.5 % (ref 40–53)
HGB BLD-MCNC: 16.5 G/DL (ref 13.3–17.7)
LDH SERPL L TO P-CCNC: 250 U/L (ref 0–250)
LYMPHOCYTES # BLD MANUAL: 28.6 10E3/UL (ref 0.8–5.3)
LYMPHOCYTES NFR BLD MANUAL: 77 %
MCH RBC QN AUTO: 30.3 PG (ref 26.5–33)
MCHC RBC AUTO-ENTMCNC: 33.3 G/DL (ref 31.5–36.5)
MCV RBC AUTO: 91 FL (ref 78–100)
MONOCYTES # BLD MANUAL: 1.5 10E3/UL (ref 0–1.3)
MONOCYTES NFR BLD MANUAL: 4 %
NEUTROPHILS # BLD MANUAL: 6.3 10E3/UL (ref 1.6–8.3)
NEUTROPHILS NFR BLD MANUAL: 17 %
OTHER CELLS # BLD MANUAL: 0.7 10E3/UL
OTHER CELLS NFR BLD MANUAL: 2 %
PATH REV: ABNORMAL
PLAT MORPH BLD: NORMAL
PLATELET # BLD AUTO: 186 10E3/UL (ref 150–450)
POTASSIUM SERPL-SCNC: 4.8 MMOL/L (ref 3.4–5.3)
PROT SERPL-MCNC: 7.1 G/DL (ref 6.4–8.3)
RBC # BLD AUTO: 5.44 10E6/UL (ref 4.4–5.9)
RBC MORPH BLD: NORMAL
SODIUM SERPL-SCNC: 141 MMOL/L (ref 135–145)
TSH SERPL DL<=0.005 MIU/L-ACNC: 1.21 UIU/ML (ref 0.3–4.2)
URATE SERPL-MCNC: 7.5 MG/DL (ref 3.4–7)
WBC # BLD AUTO: 37.2 10E3/UL (ref 4–11)

## 2025-07-24 PROCEDURE — 84550 ASSAY OF BLOOD/URIC ACID: CPT | Performed by: INTERNAL MEDICINE

## 2025-07-24 PROCEDURE — 83615 LACTATE (LD) (LDH) ENZYME: CPT | Performed by: INTERNAL MEDICINE

## 2025-07-24 PROCEDURE — 85027 COMPLETE CBC AUTOMATED: CPT | Performed by: INTERNAL MEDICINE

## 2025-07-24 PROCEDURE — G0463 HOSPITAL OUTPT CLINIC VISIT: HCPCS | Performed by: INTERNAL MEDICINE

## 2025-07-24 PROCEDURE — 85007 BL SMEAR W/DIFF WBC COUNT: CPT | Performed by: INTERNAL MEDICINE

## 2025-07-24 PROCEDURE — 84443 ASSAY THYROID STIM HORMONE: CPT | Performed by: INTERNAL MEDICINE

## 2025-07-24 PROCEDURE — 80053 COMPREHEN METABOLIC PANEL: CPT | Performed by: INTERNAL MEDICINE

## 2025-07-24 PROCEDURE — 36415 COLL VENOUS BLD VENIPUNCTURE: CPT | Performed by: INTERNAL MEDICINE

## 2025-07-24 PROCEDURE — 99213 OFFICE O/P EST LOW 20 MIN: CPT | Mod: GC | Performed by: INTERNAL MEDICINE

## 2025-07-24 ASSESSMENT — PAIN SCALES - GENERAL: PAINLEVEL_OUTOF10: NO PAIN (0)

## 2025-07-24 NOTE — PROGRESS NOTES
[facility]      FOLLOW-UP VISIT NOTE                       Jul 24, 2025  Subjective   REASON FOR VISIT: Follow up CLL    ONCOLOGIC SUMMARY:  Diagnosis:  Chronic lymphocytic leukemia dx 12/2023, presenting with asymptomatic lymphocytosis (WBC 19.7, ALC 15). Hemoglobin and platelets normal. Peripheral flow with 22% CD5+ lambda-monotypic B-cells with immunophenotype of CLL/SLL. FISH with trisomy 12 and del 13q; no 17p deletion or IGH:CCND1 fusion. NGS negative for TP53 mutation. IGHV status indeterminate as two productive sequences were identified and one was mutated while the other was unmutated. PET with mildly FDG-avid bilateral axillary LAD, thyroid lesion, and borderline splenomegaly with no uptake.     Intent of treatment: Control    Treatment history:  1/2024 to present: observation    INTERVAL HISTORY:  Christiano is here today for follow-up with wife Elisa. Doing great overall, denies any fevers, recent infections, night sweats, lumps/bumps, dysphagia, N/V, bleeding. Prior throat pain is better, able to eat/drink normally. Works out 5x/week (some stretching, cardio, weights).     I have reviewed and updated the following:  No past medical history on file.   No Known Allergies    Current Outpatient Medications:     atorvastatin (LIPITOR) 10 MG tablet, Take 10 mg by mouth daily, Disp: , Rfl:     fish oil-omega-3 fatty acids 1000 MG capsule, Take 2,000 mg by mouth daily., Disp: , Rfl:     glucosamine-chondroitin 500-400 MG CAPS per capsule, Take 2 capsules by mouth daily., Disp: , Rfl:     REVIEW OF SYSTEMS:  10-point ROS reviewed and negative other than that mentioned in HPI.     Objective   VITAL SIGNS:  /81 (BP Location: Right arm, Patient Position: Sitting, Cuff Size: Adult Large)   Pulse 55   Temp 97.9  F (36.6  C) (Oral)   Resp 16   Wt 98.8 kg (217 lb 12.8 oz)   SpO2 95%   BMI 26.51 kg/m      ECOG PS: 0     PHYSICAL EXAM:  General: Awake, alert, in no acute distress. Oriented x 3.  HEENT:  Normocephalic, atraumatic. No scleral icterus.   Lymph: Soft subcm left anterior cervical LN. No supraclavicular or axillary lymphadenopathy appreciated.   CV: Regular rate and rhythm. No murmurs, rubs, or gallops appreciated.  Resp: Good inspiratory effort, lungs clear to auscultation bilaterally.  GI: Abdomen soft, nontender, nondistended.   Ext: No peripheral edema bilaterally.  Neuro: CN II-XII grossly intact. No focal deficits.   Skin: No rash, unusual bruising or prominent lesions.  Psych: Pleasant, normal affect.    LABS:  I reviewed the following labs:  Lab Results   Component Value Date    WBC 35.3 (H) 01/24/2025    HGB 16.6 01/24/2025    HCT 48.8 01/24/2025    MCV 90 01/24/2025     01/24/2025     Lab Results   Component Value Date     01/24/2025    POTASSIUM 4.5 01/24/2025    CR 1.19 (H) 01/24/2025    BUN 18.7 01/24/2025    CHLORIDE 102 01/24/2025    JOSE 9.5 01/24/2025    GLC 93 01/24/2025      Lab Results   Component Value Date    ALT 25 01/24/2025    AST 25 01/24/2025    ALKPHOS 80 01/24/2025    BILITOTAL 0.4 01/24/2025      Lab Results   Component Value Date     01/24/2025    URIC 7.6 (H) 01/24/2025 1/21/25 Thyroid U/S:  Impression:  *  Minimally enlarged 52 mm TR 3 nodule left inferior lobe, which has  previously been sampled with core needle biopsy.  *  Other benign-appearing nodules are unchanged.    Assessment & Plan   #CLL/SLL, Mayen stage 1, intermediate-risk  Dx 12/2023, found to have absolute lymphocytosis to 15 on routine CBC. Most recent ALC in 2019 was 4.6 so this evolved sometime in the last 4 years. Hemoglobin, platelets, and LDH are normal. He is asymptomatic with no B-symptoms. Peripheral flow with 22% CD5+ lambda-monotypic B-cells consistent with CLL/SLL. Intermediate risk with trisomy 12 and del 13q, no del 17p or TP53 mutation. IGHV status indeterminate. PET with axillary LAD and borderline splenomegaly (but not palpable on exam) so this is Mayen stage I. Other  incidental findings below which may or may not be related. He is currently on surveillance as there as no indications to start treatment.   - Labs and exam reassuring today. Total WBC up a little at 35.3 (ALC pending), Hgb and plts still normal. Continue surveillance, will space out visits to q6 months now.   - We previously reviewed symptoms to watch out for including fevers, night sweats, unintentional weight loss, or rapidly enlarging lymphadenopathy. Discussed that other indications for treatment would include anemia/thrombocytopenia or ALC doubling time <6 months, which he does not have currently.    - Unclear if thyroid mass is related to the CLL given discordant flow and morph results. Will monitor closely.     #Thyroid mass  2.7 cm left thyroid lesion noted on staging PET with SUVmax 6.8. Ddx includes lymphoma or primary thyroid malignancy. TSH normal.   - 2/13/24 FNA was benign ([place] II), most consistent with lymphocytic thyroiditis. Will monitor thyroid levels to see if he develops hyper or hypothyroidism.  - 7/16/24 repeat thyroid U/S showed slightly enlargement of the left thyroid nodule to 5.2 x 2.9 x 2.4 cm. Given persistence, we obtained core biopsy instead of FNA on 9/5/24. Interestingly, the flow showed CD5+ lambda-monotypic B-cells consistent with his CLL/SLL but morphology just showed fragments of benign thyroid tissue. This could be due to either peripheral contamination or different specimen sent for flow vs morph. Either way there was no evidence of transformation to DLBCL.   - 1/21/25 repeat U/S stable. Pt remains asymptomatic. TSH pending. Repeat U/S in 6 months.     #Elevated Cr  #Elevated uric acid  Cr 1.23 and uric acid 8.2 on 1/12/24. Baseline Cr appears to be ~1.1-1.2?  - Both improved today, Cr 1.15 and uric acid 7.6.      #Ppx  IgG 996, no history of recurrent infections.  - Vaccines: Up to date on Shingrix and RSV vaccine. Recommend PCV20 booster - give today. He declined flu and  COVID booster.       PLAN:  - PCV20 today  - RTC in 6 months with thyroid U/S    Total of 25 minutes on patient visit, reviewing records, interpreting test results, placing orders, and documentation on the day of service.    The longitudinal plan of care for the diagnosis(es)/condition(s) as documented were addressed during this visit. Due to the added complexity in care, I will continue to support Christiano in the subsequent management and with ongoing continuity of care.     Nai Meyer MD  Attending Physician, [facility]

## 2025-07-24 NOTE — NURSING NOTE
Chief Complaint   Patient presents with    Blood Draw     Labs drawn via  by RN. VS taken.     Labs collected from venipuncture by RN. Vitals taken. Checked in for appointment(s).     Racheal Suarez RN

## 2025-07-24 NOTE — LETTER
7/24/2025      Kike Kee  [address]  [facility] [address]      Dear Colleague,    Thank you for referring your patient, Kike Kee, to the [facility]. Please see a copy of my visit note below.      [facility]      FOLLOW-UP VISIT NOTE                       Jul 24, 2025  Subjective  REASON FOR VISIT: Follow up CLL    ONCOLOGIC SUMMARY:  Diagnosis:  Chronic lymphocytic leukemia dx 12/2023, presenting with asymptomatic lymphocytosis (WBC 19.7, ALC 15). Hemoglobin and platelets normal. Peripheral flow with 22% CD5+ lambda-monotypic B-cells with immunophenotype of CLL/SLL. FISH with trisomy 12 and del 13q; no 17p deletion or IGH:CCND1 fusion. NGS negative for TP53 mutation. IGHV status indeterminate as two productive sequences were identified and one was mutated while the other was unmutated. PET with mildly FDG-avid bilateral axillary LAD, thyroid lesion, and borderline splenomegaly with no uptake.     Intent of treatment: Control    Treatment history:  1/2024 to present: observation    INTERVAL HISTORY:  Christiano returns today for pre-planned follow-up visit. He last had a visit 10/24/2025, and since that time has been doing well overall. Energy, appetite, sleep are grossly preserved. He has occasional pain across his back/shoulders with activity such as golfing but usually resolves with rest so he has not needed to take anything for it. Otherwise no fever, night sweats, weight loss, lumps/bumps, easy bruising, or bleeding. He furthermore denies any CP, SOB, n/v, constipation/diarrhea, dysuria, or rashes/swelling.    I have reviewed and updated the following:  No past medical history on file.   No Known Allergies    Current Outpatient Medications:      atorvastatin (LIPITOR) 10 MG tablet, Take 10 mg by mouth daily, Disp: , Rfl:      fish oil-omega-3 fatty acids 1000 MG capsule, Take 2,000 mg by mouth daily., Disp: , Rfl:      glucosamine-chondroitin 500-400 MG CAPS  per capsule, Take 2 capsules by mouth daily., Disp: , Rfl:     REVIEW OF SYSTEMS:  10-point ROS reviewed and negative other than that mentioned in HPI.     Objective  VITAL SIGNS:  /81 (BP Location: Right arm, Patient Position: Sitting, Cuff Size: Adult Large)   Pulse 55   Temp 97.9  F (36.6  C) (Oral)   Resp 16   Wt 98.8 kg (217 lb 12.8 oz)   SpO2 95%   BMI 26.51 kg/m      ECOG PS: 0     PHYSICAL EXAM:  General: Awake, alert, in no acute distress. Oriented x 3.  HEENT: Normocephalic, atraumatic. No scleral icterus.   Lymph: Soft subcm left anterior cervical LN. No supraclavicular or axillary lymphadenopathy appreciated.   CV: Regular rate and rhythm. No murmurs, rubs, or gallops appreciated.  Resp: Good inspiratory effort, lungs clear to auscultation bilaterally.  GI: Abdomen soft, nontender, nondistended.   Ext: No peripheral edema bilaterally.  Neuro: CN II-XII grossly intact. No focal deficits.   Skin: No rash, unusual bruising or prominent lesions.  Psych: Pleasant, normal affect.    LABS:  I reviewed the following labs:  Lab Results   Component Value Date    WBC 37.2 (H) 07/24/2025    HGB 16.5 07/24/2025    HCT 49.5 07/24/2025    MCV 91 07/24/2025     07/24/2025     Lab Results   Component Value Date     07/24/2025    POTASSIUM 4.8 07/24/2025    CR 1.29 (H) 07/24/2025    BUN 17.8 07/24/2025    CHLORIDE 104 07/24/2025    JOSE 10.0 07/24/2025    GLC 91 07/24/2025      Lab Results   Component Value Date    ALT 26 07/24/2025    AST 30 07/24/2025    ALKPHOS 94 07/24/2025    BILITOTAL 0.4 07/24/2025      Lab Results   Component Value Date     07/24/2025    URIC 7.5 (H) 07/24/2025     7/15/25 Thyroid U/S:  Impression:  No worrisome change in bilateral thyroid nodules, including the previously sampled TIRADS 3 nodule in the left lobe measuring up to 5.3 cm.    Assessment & Plan  #CLL/SLL, Mayen stage 1, intermediate-risk  Dx 12/2023, found to have absolute lymphocytosis to 15 on routine  CBC. Most recent ALC in 2019 was 4.6 so this evolved sometime in the last 4 years. Hemoglobin, platelets, and LDH are normal. He is asymptomatic with no B-symptoms. Peripheral flow with 22% CD5+ lambda-monotypic B-cells consistent with CLL/SLL. Intermediate risk with trisomy 12 and del 13q, no del 17p or TP53 mutation. IGHV status indeterminate. PET with axillary LAD and borderline splenomegaly (but not palpable on exam) so this is Mayen stage I. Other incidental findings below which may or may not be related. He is currently on surveillance as there as no indications to start treatment.   - Labs and exam reassuring today 7/24/25. Total WBC up a little at 37.2 vs 35.3 in 01/2025 (ALC 28.6 from 26.6), Hgb and plts still within normal range. Continue surveillance, with q6 months visits.   - We previously reviewed symptoms to watch out for including fevers, night sweats, unintentional weight loss, or rapidly enlarging lymphadenopathy. Discussed that other indications for treatment would include anemia/thrombocytopenia or ALC doubling time <6 months, which he does not have currently.    - Unclear if thyroid mass is related to the CLL given discordant flow and morph results. Will monitor closely.     #Thyroid mass  2.7 cm left thyroid lesion noted on staging PET with SUVmax 6.8. Ddx includes lymphoma or primary thyroid malignancy. TSH normal.   - 2/13/24 FNA was benign ([place] II), most consistent with lymphocytic thyroiditis. Will monitor thyroid levels to see if he develops hyper or hypothyroidism.  - 7/16/24 repeat thyroid U/S showed slightly enlargement of the left thyroid nodule to 5.2 x 2.9 x 2.4 cm. Given persistence, we obtained core biopsy instead of FNA on 9/5/24. Interestingly, the flow showed CD5+ lambda-monotypic B-cells consistent with his CLL/SLL but morphology just showed fragments of benign thyroid tissue. This could be due to either peripheral contamination or different specimen sent for flow vs morph.  Either way there was no evidence of transformation to DLBCL.   - 1/21/25 repeat U/S stable. Pt remains asymptomatic. TSH 0.81.  - 7/15/25 US thyroid stable as above. TSH 1.21. Will space out U/S to next in 1 year.     #Elevated Cr  #Elevated uric acid  On today's panel (07/24), Cr 1.29 and uric acid 7.5, near recent levels and down from peaks 1.35 (07/2024 for creatinine) and  8.2 (uric acid, 01/2024). Etiology of renal disease is unclear, but could potentially be multifactorial. Low concern for gammopathy (given low protein gap). Uric acid may be elevated due to slightly impaired renal function  - Continue to monitor  - Can consider protein electrophoresis if worsens     #Ppx  IgG 996, no history of recurrent infections.  - Vaccines: Up to date on Shingrix and RSV vaccine. Received PCV20 01/2025 and TDAP 4/2025. He has declined flu and COVID booster in the past.    PLAN:  - RTC in 6 months    Discussed with patient and Dr Meyer.    Johnson Zimmerman  Hem/Onc/BMT Fellow  [facility]  [phone]        ATTENDING ATTESTATION:  The patient was seen and evaluated by me.  I have reviewed the vital signs, medications, labs, and imaging results independently, and have discussed the patient and plan with the resident/fellow, and agree with the findings and plan outlined in this note.  The impression and plan were jointly made and the note has been edited to reflect my additions.     Nai Meyer MD  Date of Service (when I saw the patient): Jul 24, 2025     Total of 25 minutes on patient visit, reviewing records, interpreting test results, placing orders, and documentation on the day of service.     Again, thank you for allowing me to participate in the care of your patient.        Sincerely,        Nai Meyer MD    Electronically signed

## 2025-07-24 NOTE — NURSING NOTE
"Oncology Rooming Note    July 24, 2025 12:51 PM   Kike Kee is a 75 year old male who presents for:    Chief Complaint   Patient presents with    Blood Draw     Labs drawn via  by RN. VS taken.    Oncology Clinic Visit     CLL (chronic lymphocytic leukemia)     Initial Vitals: /81 (BP Location: Right arm, Patient Position: Sitting, Cuff Size: Adult Large)   Pulse 55   Temp 97.9  F (36.6  C) (Oral)   Resp 16   Wt 98.8 kg (217 lb 12.8 oz)   SpO2 95%   BMI 26.51 kg/m   Estimated body mass index is 26.51 kg/m  as calculated from the following:    Height as of 9/13/24: 1.93 m (6' 4\").    Weight as of this encounter: 98.8 kg (217 lb 12.8 oz). Body surface area is 2.3 meters squared.  No Pain (0) Comment: Data Unavailable   No LMP for male patient.  Allergies reviewed: Yes  Medications reviewed: Yes    Medications: Medication refills not needed today.  Pharmacy name entered into [employer]: [facility] #[id number] - [address]    PHQ9:  Did this patient require a PHQ9?: No      Clinical concerns: none      Lb Jolly, EMT              "

## 2025-07-25 LAB
BASOPHILS # BLD MANUAL: 0 10E3/UL (ref 0–0.2)
BASOPHILS NFR BLD MANUAL: 0 %
EOSINOPHIL # BLD MANUAL: 0 10E3/UL (ref 0–0.7)
EOSINOPHIL NFR BLD MANUAL: 0 %
LYMPHOCYTES # BLD MANUAL: 28.6 10E3/UL (ref 0.8–5.3)
LYMPHOCYTES NFR BLD MANUAL: 77 %
MONOCYTES # BLD MANUAL: 1.5 10E3/UL (ref 0–1.3)
MONOCYTES NFR BLD MANUAL: 4 %
NEUTROPHILS # BLD MANUAL: 6.3 10E3/UL (ref 1.6–8.3)
NEUTROPHILS NFR BLD MANUAL: 17 %
OTHER CELLS # BLD MANUAL: 0.7 10E3/UL
OTHER CELLS NFR BLD MANUAL: 2 %
PATH REV: ABNORMAL

## (undated) RX ORDER — LIDOCAINE HYDROCHLORIDE 10 MG/ML
INJECTION, SOLUTION EPIDURAL; INFILTRATION; INTRACAUDAL; PERINEURAL
Status: DISPENSED
Start: 2024-09-05